# Patient Record
Sex: MALE | Race: WHITE | NOT HISPANIC OR LATINO | Employment: FULL TIME | URBAN - METROPOLITAN AREA
[De-identification: names, ages, dates, MRNs, and addresses within clinical notes are randomized per-mention and may not be internally consistent; named-entity substitution may affect disease eponyms.]

---

## 2018-01-29 ENCOUNTER — HOSPITAL ENCOUNTER (EMERGENCY)
Facility: HOSPITAL | Age: 43
Discharge: HOME | End: 2018-01-29
Admitting: EMERGENCY MEDICINE
Payer: COMMERCIAL

## 2018-01-29 ENCOUNTER — APPOINTMENT (EMERGENCY)
Dept: RADIOLOGY | Facility: HOSPITAL | Age: 43
End: 2018-01-29
Payer: COMMERCIAL

## 2018-01-29 VITALS
SYSTOLIC BLOOD PRESSURE: 141 MMHG | HEART RATE: 89 BPM | WEIGHT: 180 LBS | RESPIRATION RATE: 16 BRPM | TEMPERATURE: 98.1 F | DIASTOLIC BLOOD PRESSURE: 75 MMHG | OXYGEN SATURATION: 98 %

## 2018-01-29 DIAGNOSIS — J06.9 VIRAL URI WITH COUGH: ICD-10-CM

## 2018-01-29 DIAGNOSIS — R68.89 FLU-LIKE SYMPTOMS: Primary | ICD-10-CM

## 2018-01-29 LAB — S PYO AG THROAT QL: NEGATIVE

## 2018-01-29 PROCEDURE — 71046 X-RAY EXAM CHEST 2 VIEWS: CPT

## 2018-01-29 PROCEDURE — 87070 CULTURE OTHR SPECIMN AEROBIC: CPT | Performed by: PHYSICIAN ASSISTANT

## 2018-01-29 PROCEDURE — 87430 STREP A AG IA: CPT | Performed by: PHYSICIAN ASSISTANT

## 2018-01-29 PROCEDURE — 99283 EMERGENCY DEPT VISIT LOW MDM: CPT

## 2018-01-29 NOTE — DISCHARGE INSTRUCTIONS
Viral Syndrome   WHAT YOU NEED TO KNOW:   Viral syndrome is a term used for a viral infection that has no clear cause  Viruses are spread easily from person to person through the air and on shared items  DISCHARGE INSTRUCTIONS:   Call 911 for the following:   · You have a seizure  · You cannot be woken  · You have chest pain or trouble breathing  Return to the emergency department if:   · You have a stiff neck, a bad headache, and sensitivity to light  · You feel weak, dizzy, or confused  · You stop urinating or urinate a lot less than normal      · You cough up blood or thick, yellow or green, mucus  · You have severe abdominal pain or your abdomen is larger than usual   Contact your healthcare provider if:   · Your symptoms do not get better with treatment, or get worse, after 3 days  · You have a rash or ear pain  · You have burning when you urinate  · You have questions or concerns about your condition or care  Medicines: You may  need any of the following:  · Acetaminophen  decreases pain and fever  It is available without a doctor's order  Ask how much medicine to take and how often to take it  Follow directions  Acetaminophen can cause liver damage if not taken correctly  · NSAIDs , such as ibuprofen, help decrease swelling, pain, and fever  NSAIDs can cause stomach bleeding or kidney problems in certain people  If you take blood thinner medicine, always ask your healthcare provider if NSAIDs are safe for you  Always read the medicine label and follow directions  · Cold medicine  helps decrease swelling, control a cough, and relieve chest or nasal congestion  · Saline nasal spray  helps decrease nasal congestion  · Take your medicine as directed  Contact your healthcare provider if you think your medicine is not helping or if you have side effects  Tell him of her if you are allergic to any medicine   Keep a list of the medicines, vitamins, and herbs you take  Include the amounts, and when and why you take them  Bring the list or the pill bottles to follow-up visits  Carry your medicine list with you in case of an emergency  Manage your symptoms:   · Drink liquids as directed  to prevent dehydration  Ask how much liquid to drink each day and which liquids are best for you  Ask if you should drink an oral rehydration solution (ORS)  An ORS has the right amounts of water, salts, and sugar you need to replace body fluids  This may help prevent dehydration caused by vomiting or diarrhea  Do not drink liquids with caffeine  Drinks with caffeine can make dehydration worse  · Get plenty of rest  to help your body heal  Take naps throughout the day  Ask your healthcare provider when you can return to work and your normal activities  · Use a cool mist humidifier  to help you breathe easier if you have nasal or chest congestion  Ask your healthcare provider how to use a cool mist humidifier  · Eat honey or use cough drops  to help decrease throat discomfort  Ask your healthcare provider how much honey you should eat each day  Cough drops are available without a doctor's order  Follow directions for taking cough drops  · Do not smoke and stay away from others who smoke  Nicotine and other chemicals in cigarettes and cigars can cause lung damage  Smoking can also delay healing  Ask your healthcare provider for information if you currently smoke and need help to quit  E-cigarettes or smokeless tobacco still contain nicotine  Talk to your healthcare provider before you use these products  · Wash your hands frequently  to prevent the spread of germs to others  Use soap and water  Use gel hand  when soap and water are not available  Wash your hands after you use the bathroom, cough, or sneeze  Wash your hands before you prepare or eat food    Follow up with your healthcare provider as directed:  Write down your questions so you remember to ask them during your visits  © 2017 [employer] Information is for End User's use only and may not be sold, redistributed or otherwise used for commercial purposes  All illustrations and images included in CareNotes® are the copyrighted property of [employer]  or Jeremie Blev[employer]  The above information is an  only  It is not intended as medical advice for individual conditions or treatments  Talk to your doctor, nurse or pharmacist before following any medical regimen to see if it is safe and effective for you  Influenza   WHAT YOU NEED TO KNOW:   Influenza (the flu) is an infection caused by the influenza virus  The flu is easily spread when an infected person coughs, sneezes, or has close contact with others  You may be able to spread the flu to others for 1 week or longer after signs or symptoms appear  DISCHARGE INSTRUCTIONS:   Call 911 for any of the following:   · You have trouble breathing, and your lips look purple or blue  · You have a seizure  Return to the emergency department if:   · You are dizzy, or you are urinating less or not at all  · You have a headache with a stiff neck, and you feel tired or confused  · You have new pain or pressure in your chest     · Your symptoms, such as shortness of breath, vomiting, or diarrhea, get worse  · Your symptoms, such as fever and coughing, seem to get better, but then get worse  Contact your healthcare provider if:   · You have new muscle pain or weakness  · You have questions or concerns about your condition or care  Medicines: You may need any of the following:  · Acetaminophen  decreases pain and fever  It is available without a doctor's order  Ask how much to take and how often to take it  Follow directions  Acetaminophen can cause liver damage if not taken correctly  · NSAIDs , such as ibuprofen, help decrease swelling, pain, and fever   This medicine is available with or without a doctor's order  NSAIDs can cause stomach bleeding or kidney problems in certain people  If you take blood thinner medicine, always ask your healthcare provider if NSAIDs are safe for you  Always read the medicine label and follow directions  · Antivirals  help fight a viral infection  · Take your medicine as directed  Contact your healthcare provider if you think your medicine is not helping or if you have side effects  Tell him or her if you are allergic to any medicine  Keep a list of the medicines, vitamins, and herbs you take  Include the amounts, and when and why you take them  Bring the list or the pill bottles to follow-up visits  Carry your medicine list with you in case of an emergency  Rest  as much as you can to help you recover  Drink liquids as directed  to help prevent dehydration  Ask how much liquid to drink each day and which liquids are best for you  Prevent the spread of influenza:   · Wash your hands often  Use soap and water  Wash your hands after you use the bathroom, change a child's diapers, or sneeze  Wash your hands before you prepare or eat food  Use gel hand cleanser when soap and water are not available  Do not touch your eyes, nose, or mouth unless you have washed your hands first            · Cover your mouth when you sneeze or cough  Cough into a tissue or the bend of your arm  · Clean shared items with a germ-killing   Clean table surfaces, doorknobs, and light switches  Do not share towels, silverware, and dishes with people who are sick  Wash bed sheets, towels, silverware, and dishes with soap and water  · Wear a mask  over your mouth and nose if you are sick or are near anyone who is sick  · Stay away from others  if you are sick  · Influenza vaccine  helps prevent influenza (flu)  Everyone older than 6 months should get a yearly influenza vaccine  Get the vaccine as soon as it is available, usually in September or October each year    Follow up with your healthcare provider as directed:  Write down your questions so you remember to ask them during your visits  © 2017 [employer] Information is for End User's use only and may not be sold, redistributed or otherwise used for commercial purposes  All illustrations and images included in CareNotes® are the copyrighted property of [employer]  or Jeremie Blevins  The above information is an  only  It is not intended as medical advice for individual conditions or treatments  Talk to your doctor, nurse or pharmacist before following any medical regimen to see if it is safe and effective for you

## 2018-01-29 NOTE — ED PROVIDER NOTES
History  Chief Complaint   Patient presents with    Cough     Cough and bilateral ear pain for last 5 day days  Patient is a 37 y/o male with no significant PMH who presents for evaluation of URI symptoms that began 5 days ago  He complains of subjective fever, chills, bodyaches, sore throat, bilateral ear pain, nasal congesiton and cough  He states he has been taking tylenol cold and flu and theraflu without relief  He has a decreased appetite but is still drinking fluids  He did not get a flu shot this year  He denies sick contacts  He denies shortness of breath, wheezing, asthma hx, neck pain or stiffness, rash, vomiting or diarrhea  None       History reviewed  No pertinent past medical history  Past Surgical History:   Procedure Laterality Date    APPENDECTOMY         History reviewed  No pertinent family history  I have reviewed and agree with the history as documented  Social History   Substance Use Topics    Smoking status: Never Smoker    Smokeless tobacco: Never Used    Alcohol use Yes        Review of Systems   Constitutional: Positive for appetite change, chills and fever  HENT: Positive for congestion, ear pain, sinus pressure and sore throat  Negative for ear discharge and trouble swallowing  Respiratory: Positive for cough  Negative for shortness of breath and wheezing  Cardiovascular: Negative for chest pain  Gastrointestinal: Negative for abdominal pain, diarrhea, nausea and vomiting  Loose stools occasionally    Genitourinary: Negative for difficulty urinating, dysuria and hematuria  Musculoskeletal: Negative for neck pain and neck stiffness  Skin: Negative for rash  All other systems reviewed and are negative        Physical Exam  ED Triage Vitals [01/29/18 1402]   Temperature Pulse Respirations Blood Pressure SpO2   98 1 °F (36 7 °C) 89 16 141/75 98 %      Temp Source Heart Rate Source Patient Position - Orthostatic VS BP Location FiO2 (%) Temporal Monitor Sitting Right arm --      Pain Score       7           Orthostatic Vital Signs  Vitals:    01/29/18 1402   BP: 141/75   Pulse: 89   Patient Position - Orthostatic VS: Sitting       Physical Exam   Constitutional: He is oriented to person, place, and time  Vital signs are normal  He appears well-developed and well-nourished  He is active  Non-toxic appearance  No distress  HENT:   Head: Normocephalic and atraumatic  Right Ear: Hearing, external ear and ear canal normal  Tympanic membrane is erythematous  Left Ear: Hearing, external ear and ear canal normal  Tympanic membrane is erythematous  Nose: Right sinus exhibits no maxillary sinus tenderness and no frontal sinus tenderness  Left sinus exhibits no maxillary sinus tenderness and no frontal sinus tenderness  Mouth/Throat: Uvula is midline and mucous membranes are normal  No oral lesions  No trismus in the jaw  No uvula swelling  Posterior oropharyngeal erythema present  No oropharyngeal exudate, posterior oropharyngeal edema or tonsillar abscesses  Tonsils are 2+ on the right  Tonsils are 2+ on the left  No tonsillar exudate  Mild nasal congestion    Eyes: Conjunctivae and EOM are normal    Neck: Normal range of motion  Neck supple  Cardiovascular: Normal rate, regular rhythm and normal heart sounds  Pulmonary/Chest: Effort normal and breath sounds normal  No respiratory distress  He has no wheezes  He has no rales  Abdominal: Soft  Bowel sounds are normal  He exhibits no distension and no mass  There is no tenderness  There is no guarding  Musculoskeletal: Normal range of motion  Lymphadenopathy:     He has no cervical adenopathy  Neurological: He is alert and oriented to person, place, and time  Skin: Skin is warm and dry  No rash noted  He is not diaphoretic  Psychiatric: He has a normal mood and affect  His behavior is normal  Judgment and thought content normal    Nursing note and vitals reviewed        ED Medications  Medications - No data to display    Diagnostic Studies  Results Reviewed     Procedure Component Value Units Date/Time    Rapid Beta strep screen [[id number]]  (Normal) Collected:  01/29/18 1620    Lab Status:  Final result Specimen:  Throat from Throat Updated:  01/29/18 1633     Rapid Strep A Screen Negative    Throat culture [[id number]] Collected:  01/29/18 1620    Lab Status: In process Specimen:  Throat from Throat Updated:  01/29/18 1633                 XR chest 2 views   Final Result by Tremayne Junior DO (01/29 1634)      No active pulmonary disease  Workstation performed: [id number]                    Procedures  Procedures       Phone Contacts  ED Phone Contact    ED Course  ED Course                                MDM  CritCare Time    Disposition  Final diagnoses:   Flu-like symptoms   Viral URI with cough     Time reflects when diagnosis was documented in both MDM as applicable and the Disposition within this note     Time User Action Codes Description Comment    1/29/2018  5:01 PM Dirk Spring Add [R68 89] Flu-like symptoms     1/29/2018  5:01 PM Dirk Spring Add [J06 9,  B97 89] Viral URI with cough       ED Disposition     ED Disposition Condition Comment    Discharge  [facility] [address] discharge to home/self care  Condition at discharge: Good        Follow-up Information     Follow up With Specialties Details Why Contact Info Additional Samantha [place] [facility] Schedule an appointment as soon as possible for a visit in 1 day  [address] [place] [address] [address] [id number] [facility] Department Emergency Medicine  If symptoms worsen or new symptoms arise as discussed  [facility]  [phone] [place] ED, [address] [facility]  , [place], [place], [address]        There are no discharge medications for this patient  No discharge procedures on file      ED Provider  Electronically Signed by           Charmayne Forte, PA-C  01/30/18 7861

## 2018-01-31 LAB — BACTERIA THROAT CULT: NORMAL

## 2020-10-28 ENCOUNTER — TELEMEDICINE (OUTPATIENT)
Dept: INTERNAL MEDICINE CLINIC | Facility: CLINIC | Age: 45
End: 2020-10-28

## 2020-10-28 DIAGNOSIS — J31.0 RHINITIS, UNSPECIFIED TYPE: Primary | ICD-10-CM

## 2020-10-28 PROCEDURE — 99201 PR OFFICE OUTPATIENT NEW 10 MINUTES: CPT | Performed by: PHYSICIAN ASSISTANT

## 2020-10-28 RX ORDER — LORATADINE 10 MG/1
10 TABLET ORAL DAILY
Qty: 14 TABLET | Refills: 0 | Status: SHIPPED | OUTPATIENT
Start: 2020-10-28 | End: 2021-03-11

## 2020-10-28 RX ORDER — FLUTICASONE PROPIONATE 50 MCG
2 SPRAY, SUSPENSION (ML) NASAL DAILY
Qty: 1 BOTTLE | Refills: 0 | Status: SHIPPED | OUTPATIENT
Start: 2020-10-28 | End: 2021-03-11

## 2020-12-21 RX ORDER — AMOXICILLIN 250 MG
1 CAPSULE ORAL 2 TIMES DAILY
COMMUNITY
Start: 2020-12-10 | End: 2021-03-11

## 2020-12-21 RX ORDER — IBUPROFEN 600 MG/1
600 TABLET ORAL EVERY 6 HOURS PRN
COMMUNITY
Start: 2020-12-10 | End: 2021-03-11

## 2020-12-22 ENCOUNTER — TELEPHONE (OUTPATIENT)
Dept: INTERNAL MEDICINE CLINIC | Facility: CLINIC | Age: 45
End: 2020-12-22

## 2020-12-30 ENCOUNTER — TELEMEDICINE (OUTPATIENT)
Dept: INTERNAL MEDICINE CLINIC | Facility: CLINIC | Age: 45
End: 2020-12-30

## 2020-12-30 DIAGNOSIS — S28.0XXD CRUSHED CHEST, SUBSEQUENT ENCOUNTER: ICD-10-CM

## 2020-12-30 DIAGNOSIS — R29.898 WEAKNESS OF SHOULDER: ICD-10-CM

## 2020-12-30 DIAGNOSIS — R20.0 NUMBNESS OF FINGERS: ICD-10-CM

## 2020-12-30 DIAGNOSIS — G89.29 CHRONIC LEFT SHOULDER PAIN: Primary | ICD-10-CM

## 2020-12-30 DIAGNOSIS — M25.512 CHRONIC LEFT SHOULDER PAIN: Primary | ICD-10-CM

## 2020-12-30 PROBLEM — M62.82 DISEASE CHARACTERIZED BY DESTRUCTION OF SKELETAL MUSCLE: Status: ACTIVE | Noted: 2020-12-09

## 2020-12-30 PROBLEM — S28.0XXA: Status: ACTIVE | Noted: 2020-12-09

## 2020-12-30 PROCEDURE — 99214 OFFICE O/P EST MOD 30 MIN: CPT | Performed by: PHYSICIAN ASSISTANT

## 2020-12-30 RX ORDER — TRAMADOL HYDROCHLORIDE 50 MG/1
50 TABLET ORAL EVERY 6 HOURS PRN
COMMUNITY
End: 2020-12-30 | Stop reason: SDUPTHER

## 2020-12-30 RX ORDER — TRAMADOL HYDROCHLORIDE 50 MG/1
50 TABLET ORAL EVERY 8 HOURS PRN
Qty: 20 TABLET | Refills: 0 | Status: SHIPPED | OUTPATIENT
Start: 2020-12-30 | End: 2021-02-04 | Stop reason: SDUPTHER

## 2020-12-30 RX ORDER — OXYCODONE HYDROCHLORIDE 5 MG/1
TABLET ORAL
COMMUNITY
Start: 2020-12-10 | End: 2020-12-30

## 2020-12-30 RX ORDER — METHOCARBAMOL 500 MG/1
500 TABLET, FILM COATED ORAL 3 TIMES DAILY PRN
Qty: 60 TABLET | Refills: 1 | Status: SHIPPED | OUTPATIENT
Start: 2020-12-30 | End: 2021-02-04 | Stop reason: SDUPTHER

## 2020-12-30 RX ORDER — METHOCARBAMOL 500 MG/1
500 TABLET, FILM COATED ORAL 3 TIMES DAILY PRN
COMMUNITY
Start: 2020-12-10 | End: 2020-12-30 | Stop reason: SDUPTHER

## 2021-01-08 ENCOUNTER — TELEPHONE (OUTPATIENT)
Dept: INTERNAL MEDICINE CLINIC | Facility: CLINIC | Age: 46
End: 2021-01-08

## 2021-01-08 NOTE — TELEPHONE ENCOUNTER
Jerel Zhang - I cannot do peer to peer because I did not see patient in office to examine and I dont have any more info that what patient told me - it was virtual and he did not have access to video either  This was the first time I saw him for this issue

## 2021-01-08 NOTE — TELEPHONE ENCOUNTER
This patient was scheduled for MRI LEFT SHOULDER  The information I submitted was not enough to get it approved, so [facility] is requesting a [id number]  If you are able to do so, the number is below  If you wish to withdraw this request let us know so we can call central scheduling to cancel their upcoming appointment  HAYDEN:  [phone] Option #3  [id number]  Insurance:[employer] HMO   ID#: [id number]      Attending ANTWANI [phone] Alexi Salcido please respond within 24 hrs case is time sensitive  Thank you

## 2021-02-04 ENCOUNTER — OFFICE VISIT (OUTPATIENT)
Dept: INTERNAL MEDICINE CLINIC | Facility: CLINIC | Age: 46
End: 2021-02-04

## 2021-02-04 ENCOUNTER — TELEPHONE (OUTPATIENT)
Dept: INTERNAL MEDICINE CLINIC | Facility: CLINIC | Age: 46
End: 2021-02-04

## 2021-02-04 VITALS
TEMPERATURE: 98.2 F | WEIGHT: 193.4 LBS | SYSTOLIC BLOOD PRESSURE: 127 MMHG | HEART RATE: 85 BPM | BODY MASS INDEX: 31.08 KG/M2 | DIASTOLIC BLOOD PRESSURE: 84 MMHG | HEIGHT: 66 IN

## 2021-02-04 DIAGNOSIS — R29.898 WEAKNESS OF SHOULDER: ICD-10-CM

## 2021-02-04 DIAGNOSIS — M25.512 CHRONIC LEFT SHOULDER PAIN: ICD-10-CM

## 2021-02-04 DIAGNOSIS — G89.29 CHRONIC LEFT SHOULDER PAIN: ICD-10-CM

## 2021-02-04 DIAGNOSIS — R20.0 NUMBNESS OF FINGERS: ICD-10-CM

## 2021-02-04 DIAGNOSIS — S28.0XXD CRUSHED CHEST, SUBSEQUENT ENCOUNTER: ICD-10-CM

## 2021-02-04 PROCEDURE — 3725F SCREEN DEPRESSION PERFORMED: CPT | Performed by: INTERNAL MEDICINE

## 2021-02-04 PROCEDURE — 99214 OFFICE O/P EST MOD 30 MIN: CPT | Performed by: INTERNAL MEDICINE

## 2021-02-04 RX ORDER — TRAMADOL HYDROCHLORIDE 50 MG/1
50 TABLET ORAL EVERY 8 HOURS PRN
Qty: 21 TABLET | Refills: 0 | Status: SHIPPED | OUTPATIENT
Start: 2021-02-04 | End: 2021-02-11

## 2021-02-04 RX ORDER — LIDOCAINE HYDROCHLORIDE 20 MG/ML
JELLY TOPICAL AS NEEDED
Qty: 30 ML | Refills: 1 | Status: SHIPPED | OUTPATIENT
Start: 2021-02-04 | End: 2021-03-11

## 2021-02-04 RX ORDER — METHOCARBAMOL 500 MG/1
500 TABLET, FILM COATED ORAL 3 TIMES DAILY PRN
Qty: 60 TABLET | Refills: 1 | Status: SHIPPED | OUTPATIENT
Start: 2021-02-04 | End: 2021-04-29

## 2021-02-04 NOTE — PATIENT INSTRUCTIONS
Please obtain shoulder MRI  Please continue to take Robaxin and Tramadol for shoulder pain  Please begin physical therapy  Please return after MRI is obtained

## 2021-02-04 NOTE — TELEPHONE ENCOUNTER
Receive call form patient pharmacy stating Tramadol 50 mg  required prior authorization because insurance would cover one time only of this medication  [facility]  [facility] and [facility] today 2/4/2021 with today's appt notes and last labs  Confirmation received, placed in prior authorization folder at the nurse station

## 2021-02-04 NOTE — PROGRESS NOTES
[address] [facility]  INTERNAL MEDICINE OFFICE VISIT     PATIENT INFORMATION     Girma Jewell   50 y o  male   MRN: [id number]    ASSESSMENT/PLAN     Diagnoses and all orders for this visit:    Chronic left shoulder pain  -     Ambulatory referral to Physical Therapy; Future  -     methocarbamol (ROBAXIN) 500 mg tablet; Take 1 tablet (500 mg total) by mouth 3 (three) times a day as needed (back/neck muscle spasms)  -     MRI shoulder left wo contrast; Future  -     traMADol (ULTRAM) 50 mg tablet; Take 1 tablet (50 mg total) by mouth every 8 (eight) hours as needed for severe pain for up to 7 days  -     lidocaine (XYLOCAINE) 2 % topical gel; Apply topically as needed for mild pain Left Shoulder  Numbness of fingers  -     methocarbamol (ROBAXIN) 500 mg tablet; Take 1 tablet (500 mg total) by mouth 3 (three) times a day as needed (back/neck muscle spasms)  -     traMADol (ULTRAM) 50 mg tablet; Take 1 tablet (50 mg total) by mouth every 8 (eight) hours as needed for severe pain for up to 7 days    Weakness of shoulder  -     methocarbamol (ROBAXIN) 500 mg tablet; Take 1 tablet (500 mg total) by mouth 3 (three) times a day as needed (back/neck muscle spasms)  -     traMADol (ULTRAM) 50 mg tablet; Take 1 tablet (50 mg total) by mouth every 8 (eight) hours as needed for severe pain for up to 7 days    Crushed chest, subsequent encounter  -     methocarbamol (ROBAXIN) 500 mg tablet; Take 1 tablet (500 mg total) by mouth 3 (three) times a day as needed (back/neck muscle spasms)  -     traMADol (ULTRAM) 50 mg tablet; Take 1 tablet (50 mg total) by mouth every 8 (eight) hours as needed for severe pain for up to 7 days    Schedule a follow-up appointment in 5 weeks  HEALTH MAINTENANCE     There is no immunization history on file for this patient    CHIEF COMPLAINT     Chief Complaint   Patient presents with    Shoulder Pain     left shoulder pain- having this pain for years      HISTORY OF PRESENT ILLNESS     The patient is a 50year old male with a past medical history of left shoulder pain and weakness who presented to the clinic today complaining of left shoulder pain and numbness  He is a  and states that he has been experiencing shoulder pain for many years  Recently in December the patient was bench pressing and felt his left shoulder "give out"  He went to the emergency department at that time and a CT shoulder was negative for any fracture or noticeable injury  Since that time the patient has continued to have severe shoulder pain that radiates down his arm to his fingers  He also experiences intermittent numbness of his arm, mostly at night  He denies any back or neck pain  He has no complaints with his right shoulder  He was seen via a telemedicine visit in late December and a shoulder MRI was ordered, however the study was cancelled due to the patient not being physically examined prior  He is currently taking Robaxin with mild relief  He was given tramadol in the past which helped  He has not seen physical therapy  He is continuing to power lift and uses a bilateral arm wrap called a "bench daddy" which he wears during bench press which completely alleviates the pain  Aside from his shoulder he had no other acute complaints  REVIEW OF SYSTEMS     Review of Systems   Constitutional: Negative for activity change, appetite change, chills, fatigue and fever  HENT: Negative for congestion, ear discharge, ear pain, hearing loss, sinus pain, sore throat, tinnitus and trouble swallowing  Eyes: Negative for pain and visual disturbance  Respiratory: Negative for cough, chest tightness, shortness of breath and wheezing  Cardiovascular: Negative for chest pain and leg swelling  Gastrointestinal: Negative for abdominal distention, abdominal pain, anal bleeding, blood in stool, constipation, diarrhea and nausea  Endocrine: Negative for cold intolerance and heat intolerance  Genitourinary: Negative for difficulty urinating, dysuria and frequency  Musculoskeletal: Negative for arthralgias, back pain, myalgias and neck pain  Left shoulder pain/numbness   Skin: Negative for rash  Allergic/Immunologic: Negative for environmental allergies and food allergies  Neurological: Negative for dizziness, light-headedness and headaches  Hematological: Negative for adenopathy  Psychiatric/Behavioral: Negative for agitation, behavioral problems and confusion  OBJECTIVE     Vitals:    02/04/21 1300   BP: 127/84   BP Location: Left arm   Patient Position: Sitting   Cuff Size: Standard   Pulse: 85   Temp: 98 2 °F (36 8 °C)   TempSrc: Temporal   Weight: 87 7 kg (193 lb 6 4 oz)   Height: 5' 6" (1 676 m)     Physical Exam  Constitutional:       Appearance: He is well-developed  HENT:      Head: Normocephalic and atraumatic  Nose: Nose normal    Eyes:      General:         Right eye: No discharge  Left eye: No discharge  Conjunctiva/sclera: Conjunctivae normal       Pupils: Pupils are equal, round, and reactive to light  Neck:      Thyroid: No thyromegaly  Cardiovascular:      Rate and Rhythm: Normal rate and regular rhythm  Heart sounds: Normal heart sounds  No murmur  No friction rub  No gallop  Pulmonary:      Effort: Pulmonary effort is normal  No respiratory distress  Breath sounds: Normal breath sounds  No stridor  No wheezing or rales  Chest:      Chest wall: No tenderness  Abdominal:      General: Bowel sounds are normal  There is no distension  Palpations: Abdomen is soft  There is no mass  Tenderness: There is no abdominal tenderness  There is no guarding or rebound  Musculoskeletal:         General: Tenderness (Lateral aspect of left shoulder) present  No deformity  Right lower leg: No edema  Left lower leg: No edema        Comments: Patient was unable to extend Left shoulder beyond 45 degrees on flexion, extension, and abduction due to severe pain  Adduction, internal and external rotation were normal  Sensation was intact throughout upper extremities bilaterally  Right shoulder was normal     Lymphadenopathy:      Cervical: No cervical adenopathy  Skin:     General: Skin is warm and dry  Neurological:      Mental Status: He is alert and oriented to person, place, and time  Sensory: No sensory deficit  Psychiatric:         Behavior: Behavior normal          Thought Content: Thought content normal          Judgment: Judgment normal        CURRENT MEDICATIONS     Current Outpatient Medications:     methocarbamol (ROBAXIN) 500 mg tablet, Take 1 tablet (500 mg total) by mouth 3 (three) times a day as needed (back/neck muscle spasms), Disp: 60 tablet, Rfl: 1    traMADol (ULTRAM) 50 mg tablet, Take 1 tablet (50 mg total) by mouth every 8 (eight) hours as needed for severe pain for up to 7 days, Disp: 21 tablet, Rfl: 0    fluticasone (FLONASE) 50 mcg/act nasal spray, 2 sprays into each nostril daily (Patient not taking: Reported on 12/30/2020), Disp: 1 Bottle, Rfl: 0    ibuprofen (MOTRIN) 600 mg tablet, Take 600 mg by mouth every 6 (six) hours as needed, Disp: , Rfl:     lidocaine (XYLOCAINE) 2 % topical gel, Apply topically as needed for mild pain Left Shoulder , Disp: 30 mL, Rfl: 1    loratadine (CLARITIN) 10 mg tablet, Take 1 tablet (10 mg total) by mouth daily (Patient not taking: Reported on 2/4/2021), Disp: 14 tablet, Rfl: 0    senna-docusate sodium (Senokot S) 8 6-50 mg per tablet, Take 1 tablet by mouth 2 (two) times a day, Disp: , Rfl:     PAST MEDICAL & SURGICAL HISTORY   History reviewed  No pertinent past medical history    Past Surgical History:   Procedure Laterality Date    APPENDECTOMY      MOUTH SURGERY       SOCIAL & FAMILY HISTORY     Social History     Socioeconomic History    Marital status: Single     Spouse name: Not on file    Number of children: Not on file    Years of education: Not on file    Highest education level: Not on file   Occupational History    Not on file   Social Needs    Financial resource strain: Not on file    Food insecurity     Worry: Not on file     Inability: Not on file    Transportation needs     Medical: Not on file     Non-medical: Not on file   Tobacco Use    Smoking status: Never Smoker    Smokeless tobacco: Never Used   Substance and Sexual Activity    Alcohol use: Yes     Frequency: Monthly or less     Drinks per session: 3 or 4    Drug use: No    Sexual activity: Not on file   Lifestyle    Physical activity     Days per week: Not on file     Minutes per session: Not on file    Stress: Not on file   Relationships    Social connections     Talks on phone: Not on file     Gets together: Not on file     Attends Scientologist service: Not on file     Active member of club or organization: Not on file     Attends meetings of clubs or organizations: Not on file     Relationship status: Not on file    Intimate partner violence     Fear of current or ex partner: Not on file     Emotionally abused: Not on file     Physically abused: Not on file     Forced sexual activity: Not on file   Other Topics Concern    Not on file   Social History Narrative    Not on file     Social History     Substance and Sexual Activity   Alcohol Use Yes    Frequency: Monthly or less    Drinks per session: 3 or 4     Substance and Sexual Activity   Alcohol Use Yes    Frequency: Monthly or less    Drinks per session: 3 or 4        Substance and Sexual Activity   Drug Use No     Social History     Tobacco Use   Smoking Status Never Smoker   Smokeless Tobacco Never Used     Family History   Problem Relation Age of Onset    Diabetes Mother     Hypertension Father     No Known Problems Brother      ==  Hannah Adan MD  IM Resident, PGY-1  [facility] [address]  [address]   [facility] - [address] [address]  Office: [phone]  Fax: [phone]

## 2021-02-05 ENCOUNTER — TELEPHONE (OUTPATIENT)
Dept: INTERNAL MEDICINE CLINIC | Facility: CLINIC | Age: 46
End: 2021-02-05

## 2021-02-08 NOTE — TELEPHONE ENCOUNTER
Prior-Authorization for Tramadol has been approved until 2/12/2021  Attempted made to contact patient and didn't answer  Message left on voice mail with the information and to call us back if he have any question or concern

## 2021-02-10 ENCOUNTER — TELEPHONE (OUTPATIENT)
Dept: INTERNAL MEDICINE CLINIC | Facility: CLINIC | Age: 46
End: 2021-02-10

## 2021-02-10 NOTE — TELEPHONE ENCOUNTER
Patient called to state he just received from MRI Department and was denied     Patient wants to know why MRI was denied - MRI scheduled for 2/11/2021 -    If they are indicating that he needs to do physical therapy, then patient needs to know what is wrong  If it's a tear than rehab will make it worse      Please check into this and call the patient

## 2021-02-16 ENCOUNTER — TELEPHONE (OUTPATIENT)
Dept: INTERNAL MEDICINE CLINIC | Facility: CLINIC | Age: 46
End: 2021-02-16

## 2021-02-16 NOTE — TELEPHONE ENCOUNTER
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

## 2021-02-16 NOTE — TELEPHONE ENCOUNTER
Please see 1/8/2021 telephone note then 2/10/2021 telephone note  Now patient calling asking what to do at this time   Can you please review and advise

## 2021-02-19 NOTE — TELEPHONE ENCOUNTER
Please call and let the patient know that his insurance will not cover the MRI until he has tried physical therapy  Thanks!

## 2021-02-22 NOTE — TELEPHONE ENCOUNTER
Patient made aware he needs to do the physical therapy before insurance will cover the MRI  He is not happy about it but stated he will have to do it

## 2021-03-02 ENCOUNTER — EVALUATION (OUTPATIENT)
Dept: PHYSICAL THERAPY | Facility: OTHER | Age: 46
End: 2021-03-02
Payer: COMMERCIAL

## 2021-03-02 DIAGNOSIS — M25.512 ACUTE PAIN OF LEFT SHOULDER: Primary | ICD-10-CM

## 2021-03-02 PROCEDURE — 97161 PT EVAL LOW COMPLEX 20 MIN: CPT | Performed by: PHYSICAL THERAPIST

## 2021-03-02 NOTE — PROGRESS NOTES
PT Evaluation     Today's date: 3/2/2021  Patient name: Angela Del Toro  : 1975  MRN: [id number]  Referring provider: Jihan Fang MD  Dx: No diagnosis found  Assessment  Assessment details: Angela Del Toro is a pleasant 50 y o  male who presents with Shoulder pain after performing a heavy lift on   Patient reported that he he has N/T some Neck pain  Patient reported that he is still able to still lift heavy at the gym  Unable to performed most IADL's uses UE to compensate  Patient reported unable to lift arm to to perform self care  patient has severe ER weakness and FE weakness likely limited by pain  Patient does not seem to be a good Canidate for PT at this time  He was give pain free AAROM exercises as his shoulder is getting very stiff and he will be at risk of getting adhesive capsulitis if he does not start to regain motion  Some pain is reproduced by the C-S movements and reduced  HEP issued and POC reviewed  Impairments: abnormal coordination, abnormal muscle firing, abnormal or restricted ROM, activity intolerance, impaired physical strength, lacks appropriate home exercise program, pain with function and poor body mechanics    Symptom irritability: moderateUnderstanding of Dx/Px/POC: good   Prognosis: good    Goals  Short Term:  Pt will report decreased levels of pain by at least 2 subjective ratings in 4 weeks  Pt will demonstrate improved ROM by at least 10 degrees in 4 weeks  Pt will demonstrate improved strength by 1/2 grade  Long Term:   Pt will be independent in their HEP in 8 weeks  Pt will be be pain free with IADL's  Pt will demonstrate improved FOTO, > 80         Plan  Plan details: Prognosis above is given PT services 2x/week tapering to 1x/week over the next 3 months and home program adherence    Patient would benefit from: skilled physical therapy  Planned modality interventions: thermotherapy: hydrocollator packs  Planned therapy interventions: activity modification, joint mobilization, manual therapy, motor coordination training, neuromuscular re-education, patient education, self care, therapeutic activities, therapeutic exercise, graded activity and home exercise program  Frequency: 2x week  Treatment plan discussed with: patient        Subjective Evaluation    Pain  At best pain ratin  At worst pain ratin    Patient Goals  Patient goals for therapy: decreased pain, independence with ADLs/IADLs, return to sport/leisure activities, increased motion and increased strength          Objective     General Comments:      Shoulder Comments   Special test  Unable to perform all testing secondary to pain and guarding  We discussed his ability to bench greater than 400lbs eliminates several likley pathologies in the shoulder and ultimatly PT will likley be what give him the most relief  Shoulder = joint mob = unable to assess    apprehension test=  uanble   Anterior drawer test = unable    [place] test= unable   biceps load=   NEER's=  Fong/Ángel test =     Speed's test=  unable  becca impingement test = unable  passive compression test=  lift off =unable   belly press= -  ER lag= + pain   Scapular exam: unable     PROM and AROM difficult to asses due to guarding  While performing AAROM he was able to achive about 100 degrees of FE        C-S negtive sharp pusher, VBI,  He does have limited ROM and pain secondary to lifting last night   spulings + increase L saided UT pain       Flowsheet Rows      Most Recent Value   PT/OT G-Codes   Current Score  34   Projected Score  67             Precautions: high fear avoidance       Manuals             PROM              LASER                                        Neuro Re-Ed                                                                                                        Ther Ex Ther Activity                                       Gait Training                                       Modalities

## 2021-03-09 ENCOUNTER — OFFICE VISIT (OUTPATIENT)
Dept: PHYSICAL THERAPY | Facility: OTHER | Age: 46
End: 2021-03-09
Payer: COMMERCIAL

## 2021-03-09 DIAGNOSIS — M25.512 ACUTE PAIN OF LEFT SHOULDER: Primary | ICD-10-CM

## 2021-03-09 PROCEDURE — 97140 MANUAL THERAPY 1/> REGIONS: CPT | Performed by: PHYSICAL THERAPIST

## 2021-03-09 PROCEDURE — 97110 THERAPEUTIC EXERCISES: CPT | Performed by: PHYSICAL THERAPIST

## 2021-03-09 NOTE — PROGRESS NOTES
Daily Note     Today's date: 3/9/2021  Patient name: Lisa Villarreal  : 1975  MRN: [id number]  Referring provider: Sd Varela MD  Dx:   Encounter Diagnosis     ICD-10-CM    1  Acute pain of left shoulder  M25 [id number]                   Subjective:patient still has very poor tolerance to all exercises  Unable to tolerate much in terms of exercise in PT today we will defer PT at this time until he follows up with his PCP  he does not seem to be a good candidate for PT at this time  Objective: See treatment diary below      Assessment: Tolerated treatment well  Patient demonstrated fatigue post treatment      Plan: Continue per plan of care        Precautions: high fear avoidance       Manuals 3 9 21            PROM  MJ             LASER                                        Neuro Re-Ed                                                                                                        Ther Ex             pullies  3min             S/L ER  10x2             Cane flex stretch  10''x10             Pendulums  20ea             Post capsule stretch 10''x10                                                    Ther Activity                                       Gait Training                                       Modalities

## 2021-03-10 NOTE — TELEPHONE ENCOUNTER
Patient has appointment with Jenn Robles 3/11/21 at 1:20pm  -- made notation on the screen to discuss this concern

## 2021-03-11 ENCOUNTER — OFFICE VISIT (OUTPATIENT)
Dept: INTERNAL MEDICINE CLINIC | Facility: CLINIC | Age: 46
End: 2021-03-11

## 2021-03-11 VITALS
HEART RATE: 88 BPM | HEIGHT: 66 IN | SYSTOLIC BLOOD PRESSURE: 113 MMHG | BODY MASS INDEX: 30.82 KG/M2 | WEIGHT: 191.8 LBS | TEMPERATURE: 98.1 F | OXYGEN SATURATION: 99 % | DIASTOLIC BLOOD PRESSURE: 77 MMHG

## 2021-03-11 DIAGNOSIS — M25.512 CHRONIC LEFT SHOULDER PAIN: ICD-10-CM

## 2021-03-11 DIAGNOSIS — Z01.00 ROUTINE EYE EXAM: ICD-10-CM

## 2021-03-11 DIAGNOSIS — Z00.00 ROUTINE ADULT HEALTH MAINTENANCE: Primary | ICD-10-CM

## 2021-03-11 DIAGNOSIS — Z12.5 SCREENING PSA (PROSTATE SPECIFIC ANTIGEN): ICD-10-CM

## 2021-03-11 DIAGNOSIS — Z13.220 SCREENING, LIPID: ICD-10-CM

## 2021-03-11 DIAGNOSIS — G89.29 CHRONIC LEFT SHOULDER PAIN: ICD-10-CM

## 2021-03-11 DIAGNOSIS — Z83.3 FAMILY HISTORY OF DIABETES MELLITUS: ICD-10-CM

## 2021-03-11 DIAGNOSIS — Z11.4 SCREENING FOR HIV (HUMAN IMMUNODEFICIENCY VIRUS): ICD-10-CM

## 2021-03-11 DIAGNOSIS — E66.9 CLASS 1 OBESITY WITHOUT SERIOUS COMORBIDITY WITH BODY MASS INDEX (BMI) OF 30.0 TO 30.9 IN ADULT, UNSPECIFIED OBESITY TYPE: ICD-10-CM

## 2021-03-11 DIAGNOSIS — Z13.1 SCREENING FOR DIABETES MELLITUS: ICD-10-CM

## 2021-03-11 DIAGNOSIS — R32 URINARY INCONTINENCE, UNSPECIFIED TYPE: ICD-10-CM

## 2021-03-11 PROCEDURE — 3008F BODY MASS INDEX DOCD: CPT | Performed by: PHYSICIAN ASSISTANT

## 2021-03-11 PROCEDURE — 1036F TOBACCO NON-USER: CPT | Performed by: PHYSICIAN ASSISTANT

## 2021-03-11 PROCEDURE — 99396 PREV VISIT EST AGE 40-64: CPT | Performed by: PHYSICIAN ASSISTANT

## 2021-03-11 PROCEDURE — 3008F BODY MASS INDEX DOCD: CPT | Performed by: ORTHOPAEDIC SURGERY

## 2021-03-11 RX ORDER — TRAMADOL HYDROCHLORIDE 50 MG/1
50 TABLET ORAL EVERY 6 HOURS PRN
COMMUNITY
End: 2021-04-29

## 2021-03-11 NOTE — PROGRESS NOTES
Assessment/Plan:      Diagnoses and all orders for this visit:    Routine adult health maintenance    Chronic left shoulder pain  -     Ambulatory referral to Orthopedic Surgery; Future    Urinary incontinence, unspecified type  -     PSA, total and free; Future    Routine eye exam  -     Ambulatory referral to Ophthalmology; Future    Screening, lipid  -     Lipid panel; Future    Screening for diabetes mellitus  -     Hemoglobin A1C; Future    Family history of diabetes mellitus  -     Hemoglobin A1C; Future    Screening for HIV (human immunodeficiency virus)  -     HIV 1/2 Antigen/Antibody (4th Generation) w Reflex [facility]; Future    Screening PSA (prostate specific antigen)  -     PSA, total and free; Future    Class 1 obesity without serious comorbidity with body mass index (BMI) of 30 0 to 30 9 in adult, unspecified obesity type    Other orders  -     traMADol (ULTRAM) 50 mg tablet; Take 50 mg by mouth every 6 (six) hours as needed for moderate pain      patient is a 61-year-old male presenting today for annual physical     We did briefly address his chronic left shoulder pain  He saw me a few months ago through telephone encounter to discuss getting an MRI for his left shoulder  Unfortunately my exam was very limited as patient did not want to come into the office and video was not working  He did have CT scan in December 2020 showing no acute fracture, soft tissues were normal with no fluid collection to suggest a hematoma  Unfortunately I did attempt to order an MRI which was denied by insurance and I could not do a peer to peer as I had no information further regarding positive examination findings aside from what patient told me  I have advise since his pain is continuing to have an evaluation with Orthopedics and they can order an MRI if seen necessary  He did have physical therapy evaluation and was told that he is not an appropriate candidate for PT at this time    Referral to [facility] provided  Age-appropriate education regarding screenings and prevention was addressed with patient today  Briefly tried to address BMI currently 30 96  He states he does eat whatever he wants so I would recommend more fruits and vegetables, high-protein diet and limiting fast food, junk food and excessive carbohydrates  However I do believe a lot of his BMI is elevated secondary to his muscularity  I do recommend some cardiovascular activity as well however he needs to maintain this type of physique secondary to his weight class and his training and competitions  Patient did express to me that he has had some urinary incontinence, primarily with some dripping after voiding as he is about to pull up his pants  He denies any general urinary incontinence  He does note at times waking up 1-2 times a night to urinate as well  He is interested in getting a PSA which typically younger than 48 I would not check however with symptoms I will check PSA free and total and can consider referring to Urology for further evaluation  Referral to an ophthalmologist was provided with information for [facility] as well as [facility] for sight as he would like to consider LASIK surgery  Routine labs ordered including lipid panel, PSA, HIV  Will also include hemoglobin A1c as patient reports his mom has severe diabetes on insulin  He did have CBC and CMP checked when he was in the ED in December which were fairly unremarkable  He will get these done fasting and we will call him with results    Otherwise if no further issues we will plan to see him back on an as-needed basis, 1 year for annual physical     Chief Complaint   Patient presents with    Follow-up     discuss MRI per physical therapy patient nees to f u with PCP       Subjective:     Patient ID: Jeanine Poole is a 50 y o  male     50y/o male here today for annual physical  He is feeling well aside from his ongoing shoulder issues  states his shoulder issues are 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  for lifting  takign robaxin and tramadol and not working  He has trouble pushing or lifting to the side  States was evaluated by PT after MRI was denied, and was told he is not a candidate for PT at this time  Denies changes to his health otherwise  Last dental cleaning - 5-6 years ago, brushes teeth 1 x a daily  He wears glasses and contact - about 1 year ago  states vision constantly getting works - would like to consider lasik  Currently takes [facility] [employer] vitamins  He also uses amino acid, pre-workout and creatine  Protein supplement after workout  He exercises 3 x a week  He mainly does weight training  He eats whatever he wants, states he has a fast metabolism  States he hasnt switched any weight class  He drinks about 3bottles water/day  Juice and iced tea daily  ETOH - socially  Tobacco - denies  Drugs - denies    He currently has 1 female partner  He denies any issues with ejaculation or erection  He states he does note some urinary incontinence after voiding  He cannot tell if he feels he has completely emptied his bladder  He wakes about 1-2 x a night  Lives in a house, smoke alarms working  He feels safe in the home, feels safe in relatinship  Mother - diabetes, insulin dependant  Father - HTN, hyperlipidemia                  Review of Systems   Constitutional: Negative  HENT: Negative  Eyes:        As In HPI   Respiratory: Negative  Cardiovascular: Negative  Gastrointestinal: Negative  Genitourinary: Negative  Musculoskeletal:        As in HPI   Skin: Negative  Neurological: Negative  Psychiatric/Behavioral: Negative            The following portions of the patient's history were reviewed and updated as appropriate: allergies, current medications, past family history, past medical history, past social history, past surgical history and problem list       Objective:     Physical Exam  Vitals signs reviewed  Constitutional:       General: He is not in acute distress  Appearance: Normal appearance  He is not ill-appearing or toxic-appearing  Comments: Pt with BMI elevated of 30 96, but with very muscular stature  HENT:      Head: Normocephalic and atraumatic  Right Ear: Tympanic membrane, ear canal and external ear normal  There is no impacted cerumen  Left Ear: Tympanic membrane, ear canal and external ear normal  There is no impacted cerumen  Eyes:      General:         Right eye: No discharge  Left eye: No discharge  Extraocular Movements: Extraocular movements intact  Conjunctiva/sclera: Conjunctivae normal       Pupils: Pupils are equal, round, and reactive to light  Neck:      Musculoskeletal: Neck supple  No pain with movement  Vascular: Normal carotid pulses  No carotid bruit  Cardiovascular:      Rate and Rhythm: Normal rate and regular rhythm  Pulses: Normal pulses  Heart sounds: Normal heart sounds  Pulmonary:      Effort: Pulmonary effort is normal       Breath sounds: Normal breath sounds  Abdominal:      General: Abdomen is flat  Bowel sounds are normal       Palpations: Abdomen is soft  Tenderness: There is no abdominal tenderness  Musculoskeletal:      Right lower leg: No edema  Left lower leg: No edema  Comments: Shoulder exam deferred to specialist   Lymphadenopathy:      Head:      Right side of head: No submandibular or tonsillar adenopathy  Left side of head: No submandibular or tonsillar adenopathy  Neurological:      Mental Status: He is alert and oriented to person, place, and time  Psychiatric:         Mood and Affect: Mood normal          Speech: Speech normal          Behavior: Behavior normal  Behavior is cooperative           Vitals:    03/11/21 1353   BP: 113/77   BP Location: Left arm   Patient Position: Sitting   Cuff Size: Standard Pulse: 88   Temp: 98 1 °F (36 7 °C)   TempSrc: Temporal   SpO2: 99%   Weight: 87 kg (191 lb 12 8 oz)   Height: 5' 6" (1 676 m)     BMI Counseling: Body mass index is 30 96 kg/m²  The BMI is above normal  Nutrition recommendations include reducing portion sizes, decreasing overall calorie intake, 3-5 servings of fruits/vegetables daily, reducing fast food intake, consuming healthier snacks, decreasing soda and/or juice intake, moderation in carbohydrate intake, increasing intake of lean protein, reducing intake of saturated fat and trans fat and reducing intake of cholesterol  Exercise recommendations include exercising 3-5 times per week

## 2021-03-29 ENCOUNTER — OFFICE VISIT (OUTPATIENT)
Dept: OBGYN CLINIC | Facility: OTHER | Age: 46
End: 2021-03-29
Payer: COMMERCIAL

## 2021-03-29 ENCOUNTER — APPOINTMENT (OUTPATIENT)
Dept: RADIOLOGY | Facility: OTHER | Age: 46
End: 2021-03-29
Payer: COMMERCIAL

## 2021-03-29 VITALS — WEIGHT: 191 LBS | BODY MASS INDEX: 30.83 KG/M2

## 2021-03-29 DIAGNOSIS — M25.512 CHRONIC LEFT SHOULDER PAIN: ICD-10-CM

## 2021-03-29 DIAGNOSIS — M25.512 ACUTE PAIN OF LEFT SHOULDER: ICD-10-CM

## 2021-03-29 DIAGNOSIS — M24.812 INTERNAL DERANGEMENT OF SHOULDER, LEFT: Primary | ICD-10-CM

## 2021-03-29 DIAGNOSIS — G89.29 CHRONIC LEFT SHOULDER PAIN: ICD-10-CM

## 2021-03-29 PROCEDURE — 73030 X-RAY EXAM OF SHOULDER: CPT

## 2021-03-29 PROCEDURE — 1036F TOBACCO NON-USER: CPT | Performed by: ORTHOPAEDIC SURGERY

## 2021-03-29 PROCEDURE — 99203 OFFICE O/P NEW LOW 30 MIN: CPT | Performed by: ORTHOPAEDIC SURGERY

## 2021-03-29 NOTE — PROGRESS NOTES
Assessment  Diagnoses and all orders for this visit:    Internal derangement of shoulder, left    Acute pain of left shoulder        Discussion and Plan:    Patient has an examination worrisome for rotator cuff pathology, patient was not able to tolerate physical therapy due to increased pain  He is indicated this time for an MRI scan to evaluate for surgical pathology  We will review the results of the study when it has been obtained and discuss further treatment options  Subjective:   Patient ID: Stephanie Sanchez is a 50 y o  male      HPI  The patient presents with a chief complaint of left shoulder pain  Patient is a competitive power   The pain began several years ago, however worsened in December when he was power lifting  The patient describes the pain as aching, dull and sharp in intensity,  intermittent in timing, and localizes the pain to the  left lateral shoulder  The pain is worse with movement and relieved by rest   The pain is associated with numbness and tingling  The pain is not associated with constitutional symptoms  The patient is awoken at night by the pain  The patient was referred to PT by his PCP  Patient states he was unable to tolerate physical therapy due to increased pain  The following portions of the patient's history were reviewed and updated as appropriate: allergies, current medications, past family history, past medical history, past social history, past surgical history and problem list     Review of Systems   Constitutional: Negative for chills and fever  HENT: Negative for drooling and hearing loss  Eyes: Negative for visual disturbance  Respiratory: Negative for cough and shortness of breath  Cardiovascular: Negative for chest pain  Gastrointestinal: Negative for abdominal pain  Skin: Negative for rash  Psychiatric/Behavioral: Negative for agitation  Objective:   Wt 86 6 kg (191 lb)   BMI 30 83 kg/m²       Left Shoulder Exam Muscle Strength   Abduction: 3/5   External rotation: 3/5     Other   Erythema: absent  Sensation: normal  Pulse: present     Comments:    Active ROM is limited today by pain  Full PROM            Physical Exam  Vitals signs reviewed  Constitutional:       Appearance: He is well-developed  HENT:      Head: Normocephalic  Eyes:      Pupils: Pupils are equal, round, and reactive to light  Pulmonary:      Effort: Pulmonary effort is normal    Skin:     General: Skin is warm and dry  I have personally reviewed pertinent films in PACS and my interpretation is as follows    Left shoulder x-rays demonstrates no fracture or dislocation      Scribe Attestation    I,:  Dimitris Rodriguez am acting as a scribe while in the presence of the attending physician :       I,:  Reid Maldonado MD personally performed the services described in this documentation    as scribed in my presence :

## 2021-04-13 ENCOUNTER — HOSPITAL ENCOUNTER (OUTPATIENT)
Dept: RADIOLOGY | Facility: HOSPITAL | Age: 46
Discharge: HOME | End: 2021-04-13
Attending: ORTHOPAEDIC SURGERY
Payer: COMMERCIAL

## 2021-04-13 DIAGNOSIS — M25.512 ACUTE PAIN OF LEFT SHOULDER: ICD-10-CM

## 2021-04-13 DIAGNOSIS — M24.812 INTERNAL DERANGEMENT OF SHOULDER, LEFT: ICD-10-CM

## 2021-04-13 PROCEDURE — 73221 MRI JOINT UPR EXTREM W/O DYE: CPT

## 2021-04-13 PROCEDURE — G1004 CDSM NDSC: HCPCS

## 2021-04-15 ENCOUNTER — APPOINTMENT (OUTPATIENT)
Dept: LAB | Facility: HOSPITAL | Age: 46
End: 2021-04-15
Payer: COMMERCIAL

## 2021-04-15 ENCOUNTER — TRANSCRIBE ORDERS (OUTPATIENT)
Dept: LAB | Facility: HOSPITAL | Age: 46
End: 2021-04-15

## 2021-04-15 DIAGNOSIS — Z13.220 SCREENING, LIPID: ICD-10-CM

## 2021-04-15 DIAGNOSIS — Z11.4 SCREENING FOR HIV (HUMAN IMMUNODEFICIENCY VIRUS): ICD-10-CM

## 2021-04-15 DIAGNOSIS — Z12.5 SCREENING PSA (PROSTATE SPECIFIC ANTIGEN): ICD-10-CM

## 2021-04-15 DIAGNOSIS — Z13.1 SCREENING FOR DIABETES MELLITUS: ICD-10-CM

## 2021-04-15 DIAGNOSIS — R32 URINARY INCONTINENCE, UNSPECIFIED TYPE: ICD-10-CM

## 2021-04-15 DIAGNOSIS — Z83.3 FAMILY HISTORY OF DIABETES MELLITUS: ICD-10-CM

## 2021-04-15 LAB
CHOLEST SERPL-MCNC: 203 MG/DL (ref 50–200)
EST. AVERAGE GLUCOSE BLD GHB EST-MCNC: 108 MG/DL
HBA1C MFR BLD: 5.4 %
HDLC SERPL-MCNC: 40 MG/DL
LDLC SERPL CALC-MCNC: 101 MG/DL (ref 0–100)
NONHDLC SERPL-MCNC: 163 MG/DL
TRIGL SERPL-MCNC: 311 MG/DL

## 2021-04-15 PROCEDURE — 87389 HIV-1 AG W/HIV-1&-2 AB AG IA: CPT

## 2021-04-15 PROCEDURE — 84154 ASSAY OF PSA FREE: CPT

## 2021-04-15 PROCEDURE — 83036 HEMOGLOBIN GLYCOSYLATED A1C: CPT

## 2021-04-15 PROCEDURE — 84153 ASSAY OF PSA TOTAL: CPT

## 2021-04-15 PROCEDURE — 36415 COLL VENOUS BLD VENIPUNCTURE: CPT

## 2021-04-15 PROCEDURE — 80061 LIPID PANEL: CPT

## 2021-04-16 LAB
HIV 1+2 AB+HIV1 P24 AG SERPL QL IA: NORMAL
PSA FREE MFR SERPL: 20.7 %
PSA FREE SERPL-MCNC: 0.29 NG/ML
PSA SERPL-MCNC: 1.4 NG/ML (ref 0–4)

## 2021-04-22 ENCOUNTER — OFFICE VISIT (OUTPATIENT)
Dept: OBGYN CLINIC | Facility: OTHER | Age: 46
End: 2021-04-22
Payer: COMMERCIAL

## 2021-04-22 VITALS
DIASTOLIC BLOOD PRESSURE: 82 MMHG | SYSTOLIC BLOOD PRESSURE: 124 MMHG | HEART RATE: 76 BPM | WEIGHT: 191 LBS | HEIGHT: 66 IN | BODY MASS INDEX: 30.7 KG/M2

## 2021-04-22 DIAGNOSIS — M75.102 TEAR OF LEFT SUPRASPINATUS TENDON: Primary | ICD-10-CM

## 2021-04-22 DIAGNOSIS — M25.512 ACUTE PAIN OF LEFT SHOULDER: ICD-10-CM

## 2021-04-22 PROCEDURE — 99214 OFFICE O/P EST MOD 30 MIN: CPT | Performed by: ORTHOPAEDIC SURGERY

## 2021-04-22 PROCEDURE — 1036F TOBACCO NON-USER: CPT | Performed by: ORTHOPAEDIC SURGERY

## 2021-04-22 PROCEDURE — 20610 DRAIN/INJ JOINT/BURSA W/O US: CPT | Performed by: ORTHOPAEDIC SURGERY

## 2021-04-22 RX ORDER — BUPIVACAINE HYDROCHLORIDE 2.5 MG/ML
2 INJECTION, SOLUTION INFILTRATION; PERINEURAL
Status: COMPLETED | OUTPATIENT
Start: 2021-04-22 | End: 2021-04-22

## 2021-04-22 RX ORDER — BETAMETHASONE SODIUM PHOSPHATE AND BETAMETHASONE ACETATE 3; 3 MG/ML; MG/ML
6 INJECTION, SUSPENSION INTRA-ARTICULAR; INTRALESIONAL; INTRAMUSCULAR; SOFT TISSUE
Status: COMPLETED | OUTPATIENT
Start: 2021-04-22 | End: 2021-04-22

## 2021-04-22 RX ADMIN — BETAMETHASONE SODIUM PHOSPHATE AND BETAMETHASONE ACETATE 6 MG: 3; 3 INJECTION, SUSPENSION INTRA-ARTICULAR; INTRALESIONAL; INTRAMUSCULAR; SOFT TISSUE at 15:32

## 2021-04-22 RX ADMIN — BUPIVACAINE HYDROCHLORIDE 2 ML: 2.5 INJECTION, SOLUTION INFILTRATION; PERINEURAL at 15:32

## 2021-04-22 NOTE — PATIENT INSTRUCTIONS

## 2021-04-22 NOTE — PROGRESS NOTES
Assessment  Diagnoses and all orders for this visit:    Tear of left supraspinatus tendon    Acute pain of left shoulder      Discussion and Plan:    The patient has an MRI showing high grade partial articular sided supraspinatus tendon tear  I have discussed with the patient the pathophysiology of this diagnosis and reviewed how the examination correlates with this diagnosis  Surgical vs conservative treatment options were discussed at length and after discussing these treatment options, the patient elected for CS injection  He declined a referral to PT as he has done physical therapy in the past and wishes to continue with his own routine  We did discuss surgery as arthroscopic repair of a high-grade partial articular sided supraspinatus tendon tear would be indicated if non-operative care is unable to achieve symptomatic improvement  The patient did let me know he is an avid power  and showed me a video of him bench pressing 450 lb 12 times just recently, I did explain to him that that is a bad prognostic sign for rotator cuff repair as that amount of stress could likely lead to further recurrent tear of the rotator cuff if repair is undertaken so he needs to consider that if he does wish to proceed forward with repair    Subjective:   Patient ID: Gail Murray is a 50 y o  male      HPI  Patient presents today to discuss the findings of his left shoulder MRI  Patient is a competitive power   The pain began several years ago, however worsened in December when he was power lifting  The patient describes the pain as aching, dull and sharp in intensity,  intermittent in timing, and localizes the pain to the  left lateral shoulder  The pain is worse with movement and relieved by rest   The pain is associated with numbness and tingling  The pain is not associated with constitutional symptoms  The patient is awoken at night by the pain          The following portions of the patient's history were reviewed and updated as appropriate: allergies, current medications, past family history, past medical history, past social history, past surgical history and problem list     Review of Systems   Constitutional: Negative for chills and fever  HENT: Negative for drooling and hearing loss  Eyes: Negative for visual disturbance  Respiratory: Negative for cough and shortness of breath  Cardiovascular: Negative for chest pain  Gastrointestinal: Negative for abdominal pain  Skin: Negative for rash  Psychiatric/Behavioral: Negative for agitation  Objective:  /82   Pulse 76   Ht 5' 6" (1 676 m)   Wt 86 6 kg (191 lb)   BMI 30 83 kg/m²       Left Shoulder Exam     Tenderness   The patient is experiencing no tenderness  Range of Motion   Forward flexion: 100 (Full PROM)     Muscle Strength   Abduction: 3/5   External rotation: 3/5             Physical Exam  Vitals signs reviewed  Constitutional:       Appearance: He is well-developed  HENT:      Head: Normocephalic  Eyes:      Pupils: Pupils are equal, round, and reactive to light  Pulmonary:      Effort: Pulmonary effort is normal    Skin:     General: Skin is warm and dry         Large joint arthrocentesis: L subacromial bursa  Universal Protocol:  Consent given by: patient  Patient understanding: patient states understanding of the procedure being performed  Site marked: the operative site was marked  Patient identity confirmed: verbally with patient    Supporting Documentation  Indications: pain   Procedure Details  Location: shoulder - L subacromial bursa  Needle size: 22 G  Ultrasound guidance: no  Approach: lateral  Medications administered: 2 mL bupivacaine 0 25 %; 6 mg betamethasone acetate-betamethasone sodium phosphate 6 (3-3) mg/mL    Patient tolerance: patient tolerated the procedure well with no immediate complications  Dressing:  Sterile dressing applied          I have personally reviewed pertinent films in PACS and my interpretation is as follows    MRI left shoulder demonstrates near full thickness supraspinatus tear      Scribe Attestation    I,:  Eugenia Eubanks am acting as a scribe while in the presence of the attending physician :       I,:  Greg Hunter MD personally performed the services described in this documentation    as scribed in my presence :

## 2021-04-28 ENCOUNTER — IMMUNIZATIONS (OUTPATIENT)
Dept: FAMILY MEDICINE CLINIC | Facility: HOSPITAL | Age: 46
End: 2021-04-28

## 2021-04-28 DIAGNOSIS — Z23 ENCOUNTER FOR IMMUNIZATION: Primary | ICD-10-CM

## 2021-04-28 PROCEDURE — 0001A SARS-COV-2 / COVID-19 MRNA VACCINE (PFIZER-BIONTECH) 30 MCG: CPT

## 2021-04-28 PROCEDURE — 91300 SARS-COV-2 / COVID-19 MRNA VACCINE (PFIZER-BIONTECH) 30 MCG: CPT

## 2021-04-29 ENCOUNTER — OFFICE VISIT (OUTPATIENT)
Dept: INTERNAL MEDICINE CLINIC | Facility: CLINIC | Age: 46
End: 2021-04-29

## 2021-04-29 VITALS
HEART RATE: 82 BPM | SYSTOLIC BLOOD PRESSURE: 124 MMHG | TEMPERATURE: 97.6 F | HEIGHT: 66 IN | DIASTOLIC BLOOD PRESSURE: 68 MMHG | BODY MASS INDEX: 31.21 KG/M2 | WEIGHT: 194.2 LBS

## 2021-04-29 DIAGNOSIS — R42 DIZZINESS: ICD-10-CM

## 2021-04-29 DIAGNOSIS — Z71.2 ENCOUNTER TO DISCUSS TEST RESULTS: Primary | ICD-10-CM

## 2021-04-29 DIAGNOSIS — E78.1 HYPERTRIGLYCERIDEMIA: ICD-10-CM

## 2021-04-29 DIAGNOSIS — R39.198 URINARY STREAM SLOWING: ICD-10-CM

## 2021-04-29 DIAGNOSIS — R35.1 NOCTURIA: ICD-10-CM

## 2021-04-29 PROCEDURE — 99214 OFFICE O/P EST MOD 30 MIN: CPT | Performed by: PHYSICIAN ASSISTANT

## 2021-04-29 PROCEDURE — 3008F BODY MASS INDEX DOCD: CPT | Performed by: ORTHOPAEDIC SURGERY

## 2021-04-29 PROCEDURE — 3008F BODY MASS INDEX DOCD: CPT | Performed by: PHYSICIAN ASSISTANT

## 2021-04-29 RX ORDER — MECLIZINE HYDROCHLORIDE 25 MG/1
25 TABLET ORAL 3 TIMES DAILY PRN
Qty: 30 TABLET | Refills: 0 | Status: SHIPPED | OUTPATIENT
Start: 2021-04-29 | End: 2021-08-03

## 2021-04-29 NOTE — PROGRESS NOTES
Assessment/Plan:      Diagnoses and all orders for this visit:    Urinary stream slowing  -     Ambulatory referral to Urology; Future    Nocturia  -     Ambulatory referral to Urology; Future    Dizziness  -     meclizine (ANTIVERT) 25 mg tablet; Take 1 tablet (25 mg total) by mouth 3 (three) times a day as needed for dizziness    Hypertriglyceridemia  -     Lipid panel; Future  -     Comprehensive metabolic panel; Future      46y/o male here today for review of labs, also some cocnerns today  Lab results as follows: Total chol 203, trigs 311, HDL 40,   A1C 5 4  Neg HIV  PSA 1 4    Discussed w/ pt low fat/low cholesterol diet, consider leaner meats ad foods, high protein but less sat/trans fats  Continue fish oil  Recheck lipids in 6 months  Referral to urology provided to further discuss urinary sxs  ? Side effect from workout supplements? Dizziness cause unknown, again, ? Side effect from workout supplement, dehydration  No sxs of cardiovascular problem, no CP or SOB  ? Dehydration  ? Vertigo  Trial meclizine TID x 2-3 days, then BID x 2 days, then once daily x 2 days  Increase water intake, avoid skipping meals  CMP/CBC not checked w/ recent BW as it was checked in ED in dec and was normal  Will check CMP and CBC again now to r/o anemia, electrolytes, kidneys, etc  Will monitor  Pt to continue following with ortho for shoulder  F/U in 6 months with CMP and lipids prior - fasting  CBC and CMP now  Will call with results, f/u sooner with any worsening sxs  Chief Complaint   Patient presents with    Follow-up     labs results,problem sleeping    Dizziness     when lay down and sitting,walking          Subjective:     Patient ID: Paco Malloy is a 50 y o  male     50y/o male here today for lab review and new concern of dizziness intermittent  Still has urinary issues, which he feels are getting worse   he noticed frequency of urination worse at night hen laying flat, better when he is elevated but then hurts his shoulder  Also notices slower stream at times  Ongoing left shoulder pain managed now by ortho  Had steroid inj  Which helped  He does note taking supplements for pre-workout and after working out he urinates 5-6 times  He has been taking vit B12 complex, ginsing, melatonin  He just started fish oil  He states he does eat a lot of cheese steaks and does eat higher fat foods, does not watch diet  Started with dizziness about a week ago  States he got out bed one AM he felt dizzy and almost fell into closet  states he gets multiple times a day, mainly position change lasting a few seconds each time  He states when he is doing "failure reps" at the gym, he has to stop to rest  He does get HA intermittent, hx of migraines, doesn't feel different than past migraines  Denies associated CP or SOB, lightheadedness or syncope  Review of Systems   Constitutional: Negative  Respiratory: Negative  Cardiovascular: Negative  Gastrointestinal: Negative  Genitourinary:        As in HPI   Musculoskeletal:        Left shoulder pain improved as in HPI   Neurological:        As in HPI   Psychiatric/Behavioral: Negative  The following portions of the patient's history were reviewed and updated as appropriate: allergies, current medications, past family history, past medical history, past social history, past surgical history and problem list       Objective:     Physical Exam  Vitals signs reviewed  Constitutional:       General: He is not in acute distress  Appearance: He is obese  He is not ill-appearing or toxic-appearing  Comments: BMI 31   HENT:      Head: Normocephalic and atraumatic  Right Ear: Tympanic membrane, ear canal and external ear normal       Left Ear: Tympanic membrane, ear canal and external ear normal    Eyes:      Extraocular Movements: Extraocular movements intact        Conjunctiva/sclera: Conjunctivae normal       Pupils: Pupils are equal, round, and reactive to light  Neck:      Musculoskeletal: Neck supple  Vascular: Normal carotid pulses  No carotid bruit  Cardiovascular:      Rate and Rhythm: Normal rate and regular rhythm  Pulses: Normal pulses  Heart sounds: Normal heart sounds  Pulmonary:      Effort: Pulmonary effort is normal       Breath sounds: Normal breath sounds  Musculoskeletal:      Right lower leg: No edema  Left lower leg: No edema  Comments: Shoulder exam deferred to ortho   Lymphadenopathy:      Head:      Right side of head: No submandibular or tonsillar adenopathy  Left side of head: No submandibular or tonsillar adenopathy  Neurological:      Mental Status: He is alert and oriented to person, place, and time  Motor: Motor function is intact  Coordination: Coordination is intact  Gait: Gait is intact  Psychiatric:         Mood and Affect: Mood normal          Behavior: Behavior normal  Behavior is cooperative           Vitals:    04/29/21 1433   BP: 124/68   BP Location: Left arm   Patient Position: Sitting   Cuff Size: Standard   Pulse: 82   Temp: 97 6 °F (36 4 °C)   TempSrc: Temporal   Weight: 88 1 kg (194 lb 3 2 oz)   Height: 5' 6" (1 676 m)

## 2021-05-19 ENCOUNTER — IMMUNIZATIONS (OUTPATIENT)
Dept: FAMILY MEDICINE CLINIC | Facility: HOSPITAL | Age: 46
End: 2021-05-19

## 2021-05-19 DIAGNOSIS — Z23 ENCOUNTER FOR IMMUNIZATION: Primary | ICD-10-CM

## 2021-05-19 PROCEDURE — 91300 SARS-COV-2 / COVID-19 MRNA VACCINE (PFIZER-BIONTECH) 30 MCG: CPT

## 2021-05-19 PROCEDURE — 0002A SARS-COV-2 / COVID-19 MRNA VACCINE (PFIZER-BIONTECH) 30 MCG: CPT

## 2021-06-29 ENCOUNTER — TELEPHONE (OUTPATIENT)
Dept: INTERNAL MEDICINE CLINIC | Facility: CLINIC | Age: 46
End: 2021-06-29

## 2021-06-29 ENCOUNTER — CLINICAL SUPPORT (OUTPATIENT)
Dept: INTERNAL MEDICINE CLINIC | Facility: CLINIC | Age: 46
End: 2021-06-29

## 2021-06-29 DIAGNOSIS — Z11.1 SCREENING FOR TUBERCULOSIS: Primary | ICD-10-CM

## 2021-06-29 PROCEDURE — 86580 TB INTRADERMAL TEST: CPT

## 2021-06-29 NOTE — TELEPHONE ENCOUNTER
Patient is here today for PPD placement  PPD placed in left forearm  Patient is not sure if he has any paperwork, he will check with his employer, [employer]    TB test was placed on 6/29/21 at 12:45 p m  Patient is aware to return for TB test reading on 7/1/21 after 12:45 or 7/2/21 before 12:45    Patient was provided a TB appointment reminder with this information

## 2021-07-01 ENCOUNTER — CLINICAL SUPPORT (OUTPATIENT)
Dept: INTERNAL MEDICINE CLINIC | Facility: CLINIC | Age: 46
End: 2021-07-01

## 2021-07-01 DIAGNOSIS — Z23 NEED FOR TETANUS BOOSTER: Primary | ICD-10-CM

## 2021-07-01 PROCEDURE — 90715 TDAP VACCINE 7 YRS/> IM: CPT | Performed by: PHYSICIAN ASSISTANT

## 2021-07-01 PROCEDURE — 90471 IMMUNIZATION ADMIN: CPT | Performed by: PHYSICIAN ASSISTANT

## 2021-07-01 NOTE — PROGRESS NOTES
PPD read on 7/1/2021 as negative, 0mm - patient also requested to have a TDAP, ok per Caryn Schmitt so I gave it in left deltoid

## 2021-07-02 NOTE — TELEPHONE ENCOUNTER
Late entry    Patient came into the office on 7/1/21 for PPD reading   Read as negative, 0 mm - copy of results given to patient

## 2021-07-08 ENCOUNTER — OFFICE VISIT (OUTPATIENT)
Dept: OBGYN CLINIC | Facility: OTHER | Age: 46
End: 2021-07-08
Payer: COMMERCIAL

## 2021-07-08 VITALS
DIASTOLIC BLOOD PRESSURE: 82 MMHG | SYSTOLIC BLOOD PRESSURE: 115 MMHG | HEART RATE: 93 BPM | BODY MASS INDEX: 31.21 KG/M2 | HEIGHT: 66 IN | WEIGHT: 194.22 LBS

## 2021-07-08 DIAGNOSIS — M75.102 TEAR OF LEFT SUPRASPINATUS TENDON: Primary | ICD-10-CM

## 2021-07-08 PROCEDURE — 20610 DRAIN/INJ JOINT/BURSA W/O US: CPT | Performed by: PHYSICIAN ASSISTANT

## 2021-07-08 PROCEDURE — 3008F BODY MASS INDEX DOCD: CPT | Performed by: PHYSICIAN ASSISTANT

## 2021-07-08 PROCEDURE — 99213 OFFICE O/P EST LOW 20 MIN: CPT | Performed by: PHYSICIAN ASSISTANT

## 2021-07-08 PROCEDURE — 1036F TOBACCO NON-USER: CPT | Performed by: PHYSICIAN ASSISTANT

## 2021-07-08 RX ORDER — METHYLPREDNISOLONE ACETATE 40 MG/ML
1 INJECTION, SUSPENSION INTRA-ARTICULAR; INTRALESIONAL; INTRAMUSCULAR; SOFT TISSUE
Status: COMPLETED | OUTPATIENT
Start: 2021-07-08 | End: 2021-07-08

## 2021-07-08 RX ORDER — BUPIVACAINE HYDROCHLORIDE 2.5 MG/ML
2 INJECTION, SOLUTION INFILTRATION; PERINEURAL
Status: COMPLETED | OUTPATIENT
Start: 2021-07-08 | End: 2021-07-08

## 2021-07-08 RX ADMIN — BUPIVACAINE HYDROCHLORIDE 2 ML: 2.5 INJECTION, SOLUTION INFILTRATION; PERINEURAL at 15:21

## 2021-07-08 RX ADMIN — METHYLPREDNISOLONE ACETATE 1 ML: 40 INJECTION, SUSPENSION INTRA-ARTICULAR; INTRALESIONAL; INTRAMUSCULAR; SOFT TISSUE at 15:21

## 2021-07-08 NOTE — PROGRESS NOTES
Assessment  Diagnoses and all orders for this visit:    Tear of left supraspinatus tendon    Other orders  -     Large joint arthrocentesis        Discussion and Plan:    1  Subacromial steroid injection - Depo secondary to patient insurance  2  Follow up as needed  3  Slowly resume all activities to tolerance  4  Continue with HEP as instructed by his therapist    Subjective:   Patient ID: Ashlyn Stinson is a 50 y o  male      Hamp Louise presents to the office in follow up of the right shoulder for his partial articular sided supraspinatus tendon tear  He was provided a steroid injection at last visit which helped  He has noticed more strength and motion of the shoulder  It continues to feel little stiff with overhead motion and has some discomfort with overhead exercises  Denies any limitations of the shoulder with ADLs or at work  Denies new injury or trauma  Denies numbness or tingling        The following portions of the patient's history were reviewed and updated as appropriate: allergies, current medications, past family history, past medical history, past social history, past surgical history and problem list     Review of Systems   Constitutional: Negative for chills and fever  HENT: Negative for hearing loss  Eyes: Negative for visual disturbance  Respiratory: Negative for shortness of breath  Cardiovascular: Negative for chest pain  Gastrointestinal: Negative for abdominal pain  Musculoskeletal:        As reviewed in the HPI   Skin: Negative for rash  Neurological:        As reviewed in the HPI   Psychiatric/Behavioral: Negative for agitation  Objective:  /82 (BP Location: Right arm, Patient Position: Sitting, Cuff Size: Adult)   Pulse 93   Ht 5' 6" (1 676 m)   Wt 88 1 kg (194 lb 3 6 oz)   BMI 31 35 kg/m²       Left Shoulder Exam     Tenderness   The patient is experiencing no tenderness       Range of Motion   Passive abduction: 90   External rotation: 40   Forward flexion: 160     Muscle Strength   External rotation: 5/5   Supraspinatus: 5/5     Tests   Fong test: positive  Impingement: negative  Drop arm: negative    Other   Erythema: absent  Sensation: normal  Pulse: present             Physical Exam  Constitutional:       Appearance: He is well-developed  HENT:      Head: Normocephalic  Pulmonary:      Effort: No respiratory distress  Breath sounds: No wheezing  Musculoskeletal:      Cervical back: Normal range of motion  Skin:     General: Skin is warm and dry  Neurological:      Mental Status: He is alert and oriented to person, place, and time  Psychiatric:         Behavior: Behavior normal          Thought Content: Thought content normal          Judgment: Judgment normal            Large joint arthrocentesis: L subacromial bursa  Universal Protocol:  Consent: Verbal consent obtained    Consent given by: patient    Supporting Documentation  Indications: pain   Procedure Details  Location: shoulder - L subacromial bursa  Preparation: Patient was prepped and draped in the usual sterile fashion  Needle size: 22 G  Approach: lateral  Medications administered: 2 mL bupivacaine 0 25 %; 1 mL methylPREDNISolone acetate 40 mg/mL    Patient tolerance: patient tolerated the procedure well with no immediate complications  Dressing:  Sterile dressing applied

## 2021-07-08 NOTE — PATIENT INSTRUCTIONS

## 2021-07-26 ENCOUNTER — TELEPHONE (OUTPATIENT)
Dept: INTERNAL MEDICINE CLINIC | Facility: CLINIC | Age: 46
End: 2021-07-26

## 2021-07-26 NOTE — TELEPHONE ENCOUNTER
Patient called requesting titers to be completed for employment  Patient needs mumps, measles, rubella & varicella completed   Please review

## 2021-07-27 NOTE — TELEPHONE ENCOUNTER
Patient called again to check status  Can labs please be ordered  Call patient when he go have it done

## 2021-07-27 NOTE — TELEPHONE ENCOUNTER
Orders placed per staff documentation patient requesting mumps, measles, rubella and varicella titers

## 2021-08-03 ENCOUNTER — OFFICE VISIT (OUTPATIENT)
Dept: INTERNAL MEDICINE CLINIC | Facility: CLINIC | Age: 46
End: 2021-08-03

## 2021-08-03 ENCOUNTER — APPOINTMENT (OUTPATIENT)
Dept: LAB | Facility: CLINIC | Age: 46
End: 2021-08-03
Payer: COMMERCIAL

## 2021-08-03 VITALS
WEIGHT: 197.2 LBS | DIASTOLIC BLOOD PRESSURE: 80 MMHG | BODY MASS INDEX: 38.72 KG/M2 | HEART RATE: 70 BPM | HEIGHT: 60 IN | SYSTOLIC BLOOD PRESSURE: 113 MMHG

## 2021-08-03 DIAGNOSIS — E78.1 HYPERTRIGLYCERIDEMIA: ICD-10-CM

## 2021-08-03 DIAGNOSIS — R35.1 NOCTURIA: Primary | ICD-10-CM

## 2021-08-03 DIAGNOSIS — Z01.84 IMMUNITY STATUS TESTING: ICD-10-CM

## 2021-08-03 LAB
ALBUMIN SERPL BCP-MCNC: 4 G/DL (ref 3.5–5)
ALP SERPL-CCNC: 83 U/L (ref 46–116)
ALT SERPL W P-5'-P-CCNC: 56 U/L (ref 12–78)
ANION GAP SERPL CALCULATED.3IONS-SCNC: 6 MMOL/L (ref 4–13)
AST SERPL W P-5'-P-CCNC: 21 U/L (ref 5–45)
BACTERIA UR QL AUTO: NORMAL /HPF
BILIRUB SERPL-MCNC: 0.41 MG/DL (ref 0.2–1)
BILIRUB UR QL STRIP: NEGATIVE
BUN SERPL-MCNC: 14 MG/DL (ref 5–25)
CALCIUM SERPL-MCNC: 9 MG/DL (ref 8.3–10.1)
CHLORIDE SERPL-SCNC: 104 MMOL/L (ref 100–108)
CHOLEST SERPL-MCNC: 234 MG/DL (ref 50–200)
CLARITY UR: CLEAR
CO2 SERPL-SCNC: 26 MMOL/L (ref 21–32)
COLOR UR: YELLOW
CREAT SERPL-MCNC: 1.15 MG/DL (ref 0.6–1.3)
GFR SERPL CREATININE-BSD FRML MDRD: 75 ML/MIN/1.73SQ M
GLUCOSE P FAST SERPL-MCNC: 89 MG/DL (ref 65–99)
GLUCOSE UR STRIP-MCNC: NEGATIVE MG/DL
HDLC SERPL-MCNC: 42 MG/DL
HGB UR QL STRIP.AUTO: NEGATIVE
HYALINE CASTS #/AREA URNS LPF: NORMAL /LPF
KETONES UR STRIP-MCNC: NEGATIVE MG/DL
LDLC SERPL CALC-MCNC: 124 MG/DL (ref 0–100)
LEUKOCYTE ESTERASE UR QL STRIP: NEGATIVE
NITRITE UR QL STRIP: NEGATIVE
NON-SQ EPI CELLS URNS QL MICRO: NORMAL /HPF
NONHDLC SERPL-MCNC: 192 MG/DL
PH UR STRIP.AUTO: 6 [PH]
POTASSIUM SERPL-SCNC: 3.6 MMOL/L (ref 3.5–5.3)
PROT SERPL-MCNC: 7.5 G/DL (ref 6.4–8.2)
PROT UR STRIP-MCNC: NEGATIVE MG/DL
RBC #/AREA URNS AUTO: NORMAL /HPF
RUBV IGG SERPL IA-ACNC: 1.5 IU/ML
SODIUM SERPL-SCNC: 136 MMOL/L (ref 136–145)
SP GR UR STRIP.AUTO: 1.02 (ref 1–1.03)
TRIGL SERPL-MCNC: 342 MG/DL
UROBILINOGEN UR QL STRIP.AUTO: 0.2 E.U./DL
WBC #/AREA URNS AUTO: NORMAL /HPF

## 2021-08-03 PROCEDURE — 36415 COLL VENOUS BLD VENIPUNCTURE: CPT

## 2021-08-03 PROCEDURE — 86765 RUBEOLA ANTIBODY: CPT

## 2021-08-03 PROCEDURE — 86735 MUMPS ANTIBODY: CPT

## 2021-08-03 PROCEDURE — 99213 OFFICE O/P EST LOW 20 MIN: CPT | Performed by: PHYSICIAN ASSISTANT

## 2021-08-03 PROCEDURE — 86762 RUBELLA ANTIBODY: CPT

## 2021-08-03 PROCEDURE — 86787 VARICELLA-ZOSTER ANTIBODY: CPT

## 2021-08-03 PROCEDURE — 80061 LIPID PANEL: CPT

## 2021-08-03 PROCEDURE — 80053 COMPREHEN METABOLIC PANEL: CPT

## 2021-08-03 PROCEDURE — 81001 URINALYSIS AUTO W/SCOPE: CPT | Performed by: PHYSICIAN ASSISTANT

## 2021-08-03 RX ORDER — OXYBUTYNIN CHLORIDE 5 MG/1
5 TABLET, EXTENDED RELEASE ORAL DAILY
Qty: 30 TABLET | Refills: 2 | Status: SHIPPED | OUTPATIENT
Start: 2021-08-03 | End: 2022-05-26 | Stop reason: ALTCHOICE

## 2021-08-03 NOTE — PATIENT INSTRUCTIONS
Today we discussed your symptom of urinary frequency, primarily nocturia which is urinary frequency more so at night  We discussed that your PSA level was 1 4 with recent blood work a few months ago which is decent  We discussed that I referred you to Urology back in April however you are unable to go to the appointment  You should have this rescheduled before leaving the office today here at the clinic so you can have your symptoms further addressed  We discussed that possibly something in your workout supplements may be causing some urinary symptoms so I would advise bringing your workout supplements to the urology visit with you just in case  We will collect a urine specimen from you to send out for urinalysis with microscopic  In the meantime while we are waiting for further workup for your urine as well as for you to see Urology, we can try oxybutynin which is a medicine that we used for urinary frequency and overactive bladder  I advised that you take 5 mg once a day to start and if appropriate and needing an increase in the dose, urology can reassess this

## 2021-08-03 NOTE — PROGRESS NOTES
Assessment/Plan:      Diagnoses and all orders for this visit:    Nocturia  -     Urinalysis with microscopic  -     oxybutynin (DITROPAN-XL) 5 mg 24 hr tablet; Take 1 tablet (5 mg total) by mouth daily      Today we discussed your symptom of urinary frequency, primarily nocturia which is urinary frequency more so at night  We discussed that your PSA level was 1 4 with recent blood work a few months ago which is decent  We discussed that I referred you to Urology back in April however you are unable to go to the appointment  You should have this rescheduled before leaving the office today here at the clinic so you can have your symptoms further addressed  We discussed that possibly something in your workout supplements may be causing some urinary symptoms so I would advise bringing your workout supplements to the urology visit with you just in case  We will collect a urine specimen from you to send out for urinalysis with microscopic  In the meantime while we are waiting for further workup for your urine as well as for you to see Urology, we can try oxybutynin which is a medicine that we used for urinary frequency and overactive bladder  I advised that you take 5 mg once a day to start and if appropriate and needing an increase in the dose, urology can reassess this  Chief Complaint   Patient presents with    Urinary Frequency     frequent urination, has no idea when this started       Subjective:     Patient ID: Natalie Bettencourt is a 48 y o  male     45y/o male here today for discussion of ongoign nocturia, for a while now  States he is frustrated because he wakes up around 3 x a night and is interrupting his sleep  States  A few months ago was going about 2 x a night  He has noticed if he also has urinary frequency during the day - states he does drink a lot of fluids during he day  He has not noticed weak stream  He has not noticed urinary hesitancy   Denies gross hematuria  Denies pelvic pain  I did already address concerns with him at appt in April 2021 - pt shceduled for urology appt here at clinic 6/1 but no showed  Review of Systems   Constitutional: Negative  Respiratory: Negative  Cardiovascular: Negative  Gastrointestinal: Negative  Genitourinary:        As in HPI   Neurological: Negative  The following portions of the patient's history were reviewed and updated as appropriate: allergies, current medications, past family history, past medical history, past social history, past surgical history and problem list       Objective:     Physical Exam  Vitals reviewed  Constitutional:       General: He is not in acute distress  Appearance: He is obese  He is not ill-appearing or toxic-appearing  Comments: Pt BMI 38 51 though pt has muscular physique    Cardiovascular:      Rate and Rhythm: Normal rate and regular rhythm  Heart sounds: Normal heart sounds  Pulmonary:      Effort: Pulmonary effort is normal       Breath sounds: Normal breath sounds  Abdominal:      General: Bowel sounds are normal  There is no distension  Tenderness: There is no abdominal tenderness  Musculoskeletal:      Cervical back: Neck supple  Neurological:      Mental Status: He is alert and oriented to person, place, and time     Psychiatric:         Mood and Affect: Mood normal          Behavior: Behavior normal          Vitals:    08/03/21 1054   BP: 113/80   BP Location: Left arm   Patient Position: Sitting   Cuff Size: Large   Pulse: 70   Weight: 89 4 kg (197 lb 3 2 oz)   Height: 5' (1 524 m)

## 2021-08-05 LAB
MEV IGG SER QL: NORMAL
MUV IGG SER QL: ABNORMAL
VZV IGG SER IA-ACNC: NORMAL

## 2021-08-20 ENCOUNTER — TELEPHONE (OUTPATIENT)
Dept: UROLOGY | Facility: MEDICAL CENTER | Age: 46
End: 2021-08-20

## 2021-08-20 NOTE — TELEPHONE ENCOUNTER
Please Triage [facility]  New Patient-     What is the reason for the patients appointment? Patient called stating he was referred to see Urology by [facility] Patient has frequency at night time   He gets up a lot times at night time and sometimes slow stream         Imaging/Lab Results:      Do we accept the patient's insurance or is the patient Self-Pay? Provider & Plan: [facility]   Member ID#: Has the patient had any previous urologist(s)?no        Have patient records been requested?in epic        Has the patient had any outside testing done?       Does the patient have a personal history of cancer?no       Patient can be reached at :[phone] (Z)

## 2021-08-20 NOTE — TELEPHONE ENCOUNTER
Left message stating patient an appointment has been tentatively scheduled for 8/30 at 10:15  Advised patient to call the office to confirm or deny the appointment

## 2021-08-25 ENCOUNTER — CLINICAL SUPPORT (OUTPATIENT)
Dept: INTERNAL MEDICINE CLINIC | Facility: CLINIC | Age: 46
End: 2021-08-25

## 2021-08-25 DIAGNOSIS — Z23 NEED FOR MMR VACCINE: Primary | ICD-10-CM

## 2021-08-25 PROCEDURE — 90471 IMMUNIZATION ADMIN: CPT | Performed by: INTERNAL MEDICINE

## 2021-08-25 PROCEDURE — 90707 MMR VACCINE SC: CPT | Performed by: INTERNAL MEDICINE

## 2021-08-25 NOTE — PROGRESS NOTES
Patient came into the office for MMR #1  Given for lack of immunity (see task under labs)  0 5 ml given in right arm SQ   Patient to return in 6 months for second dose

## 2021-09-08 ENCOUNTER — OFFICE VISIT (OUTPATIENT)
Dept: INTERNAL MEDICINE CLINIC | Facility: CLINIC | Age: 46
End: 2021-09-08

## 2021-09-08 VITALS
HEART RATE: 103 BPM | HEIGHT: 60 IN | TEMPERATURE: 98 F | OXYGEN SATURATION: 97 % | WEIGHT: 192 LBS | DIASTOLIC BLOOD PRESSURE: 52 MMHG | SYSTOLIC BLOOD PRESSURE: 80 MMHG | BODY MASS INDEX: 37.69 KG/M2

## 2021-09-08 DIAGNOSIS — G47.9 SLEEP DISTURBANCE: Primary | ICD-10-CM

## 2021-09-08 DIAGNOSIS — Z78.9 NOT IMMUNE TO RUBELLA: ICD-10-CM

## 2021-09-08 PROCEDURE — 99213 OFFICE O/P EST LOW 20 MIN: CPT | Performed by: PHYSICIAN ASSISTANT

## 2021-09-08 RX ORDER — TRAZODONE HYDROCHLORIDE 50 MG/1
50 TABLET ORAL
Qty: 30 TABLET | Refills: 2 | Status: SHIPPED | OUTPATIENT
Start: 2021-09-08 | End: 2021-10-21 | Stop reason: SDUPTHER

## 2021-09-08 NOTE — PROGRESS NOTES
Assessment/Plan:      Diagnoses and all orders for this visit:    Sleep disturbance  -     traZODone (DESYREL) 50 mg tablet; Take 1 tablet (50 mg total) by mouth daily at bedtime    Not immune to rubella      53-year-old male presenting today for discussion of sleep disturbances most likely as a result to new job working night shift  Risks versus benefits of medications were addressed the patient is agreeable to starting medication  I did discuss with patient appropriate sleep hygiene which includes darkening the room and avoiding any bright lights or stimulation from electronics and TV before sleep  He can take a nice warm shower, calming music, Lavender, etc  He is agreeable to starting trazodone 50 mg before bed in addition to his melatonin  Risks versus benefits, potential side effects discussed  He understands to give this a few weeks before he sees if it helps and may need an increase in dose as well  Patient concern as he states his job will not let him work if he has to wait 6 months to get his 2nd MMR  I am not sure who told him this however in his immunity blood work I would mention that he can get his 2nd MMR 4 weeks after the 1st per CDC  Patient understands this and will be scheduled at the end of the month to get his 2nd dose and we can provide him with a vaccine record at that time  Chief Complaint   Patient presents with    Follow-up     still not sleeping through the night  mumps rebella, work stating needs a letter stating the shot will be good for the time he has it       Subjective:     Patient ID: Susanna Noel is a 48 y o  male     47y/o male here today for discussion of poor sleep habits and discussion of 2nd MMR for work      states working overnights and finding difficulty with his sleeping patterns  States he feels his sleep is poor, difficulty falling asleep and staying a lseep, states very restless  He tries to watch TV before bed to relax       states was taking a sleep aid from "[employer]"  He does take melatonin 3mg a pill  Started working night shift 1 5 months ago  He also states he needs a letter stating he has to wait 6 months to get 2nd MMR vaccine as he was told when he was here to get first    He had to get immunity titers with mom's showing equivocal result in rubella showing no immunity  Review of Systems   Constitutional: Negative  Respiratory: Negative  Cardiovascular: Negative  Gastrointestinal: Negative  Genitourinary: Negative  Neurological: Negative  Psychiatric/Behavioral: Positive for sleep disturbance  The following portions of the patient's history were reviewed and updated as appropriate: allergies, current medications, past family history, past medical history, past social history, past surgical history and problem list       Objective:     Physical Exam  Vitals reviewed  Constitutional:       General: He is not in acute distress  Appearance: He is not ill-appearing or toxic-appearing  Cardiovascular:      Rate and Rhythm: Normal rate and regular rhythm  Heart sounds: Normal heart sounds  Comments: Low BP - slightly lower than pt baseline  He is asymptomatic  Pulmonary:      Effort: Pulmonary effort is normal       Breath sounds: Normal breath sounds  Musculoskeletal:      Cervical back: Neck supple  Neurological:      Mental Status: He is alert and oriented to person, place, and time  Psychiatric:         Mood and Affect: Mood normal          Behavior: Behavior normal          Thought Content:  Thought content normal          Vitals:    09/08/21 1532   BP: (!) 80/52   BP Location: Left arm   Patient Position: Sitting   Cuff Size: Large   Pulse: 103   Temp: 98 °F (36 7 °C)   TempSrc: Temporal   SpO2: 97%   Weight: 87 1 kg (192 lb)   Height: 5' (1 524 m)

## 2021-09-29 ENCOUNTER — CLINICAL SUPPORT (OUTPATIENT)
Dept: INTERNAL MEDICINE CLINIC | Facility: CLINIC | Age: 46
End: 2021-09-29

## 2021-09-29 DIAGNOSIS — Z23 NEED FOR MMR VACCINE: Primary | ICD-10-CM

## 2021-09-29 DIAGNOSIS — Z23 NEED FOR INFLUENZA VACCINATION: ICD-10-CM

## 2021-09-29 PROCEDURE — 90686 IIV4 VACC NO PRSV 0.5 ML IM: CPT | Performed by: INTERNAL MEDICINE

## 2021-09-29 PROCEDURE — 90707 MMR VACCINE SC: CPT | Performed by: INTERNAL MEDICINE

## 2021-09-29 PROCEDURE — 90471 IMMUNIZATION ADMIN: CPT | Performed by: INTERNAL MEDICINE

## 2021-09-29 PROCEDURE — 90472 IMMUNIZATION ADMIN EACH ADD: CPT | Performed by: INTERNAL MEDICINE

## 2021-10-19 ENCOUNTER — TELEMEDICINE (OUTPATIENT)
Dept: INTERNAL MEDICINE CLINIC | Facility: CLINIC | Age: 46
End: 2021-10-19

## 2021-10-19 DIAGNOSIS — J06.9 UPPER RESPIRATORY TRACT INFECTION, UNSPECIFIED TYPE: Primary | ICD-10-CM

## 2021-10-19 PROCEDURE — 99212 OFFICE O/P EST SF 10 MIN: CPT | Performed by: PHYSICIAN ASSISTANT

## 2021-10-19 RX ORDER — IBUPROFEN 600 MG/1
600 TABLET ORAL EVERY 6 HOURS PRN
Qty: 30 TABLET | Refills: 0 | Status: SHIPPED | OUTPATIENT
Start: 2021-10-19 | End: 2022-05-26 | Stop reason: ALTCHOICE

## 2021-10-19 RX ORDER — BENZONATATE 200 MG/1
200 CAPSULE ORAL 3 TIMES DAILY PRN
Qty: 30 CAPSULE | Refills: 0 | Status: SHIPPED | OUTPATIENT
Start: 2021-10-19 | End: 2022-05-26 | Stop reason: ALTCHOICE

## 2021-10-20 ENCOUNTER — OFFICE VISIT (OUTPATIENT)
Dept: URGENT CARE | Age: 46
End: 2021-10-20
Payer: COMMERCIAL

## 2021-10-20 ENCOUNTER — TELEPHONE (OUTPATIENT)
Dept: INTERNAL MEDICINE CLINIC | Facility: CLINIC | Age: 46
End: 2021-10-20

## 2021-10-20 VITALS — WEIGHT: 192.6 LBS | HEART RATE: 90 BPM | TEMPERATURE: 96.8 F | BODY MASS INDEX: 37.61 KG/M2 | OXYGEN SATURATION: 98 %

## 2021-10-20 DIAGNOSIS — Z11.59 SPECIAL SCREENING EXAMINATION FOR VIRAL DISEASE: Primary | ICD-10-CM

## 2021-10-20 DIAGNOSIS — J02.9 SORE THROAT: ICD-10-CM

## 2021-10-20 LAB — S PYO AG THROAT QL: NEGATIVE

## 2021-10-20 PROCEDURE — 99213 OFFICE O/P EST LOW 20 MIN: CPT | Performed by: PHYSICIAN ASSISTANT

## 2021-10-20 PROCEDURE — 87880 STREP A ASSAY W/OPTIC: CPT | Performed by: PHYSICIAN ASSISTANT

## 2021-10-20 PROCEDURE — U0005 INFEC AGEN DETEC AMPLI PROBE: HCPCS | Performed by: PHYSICIAN ASSISTANT

## 2021-10-20 PROCEDURE — U0003 INFECTIOUS AGENT DETECTION BY NUCLEIC ACID (DNA OR RNA); SEVERE ACUTE RESPIRATORY SYNDROME CORONAVIRUS 2 (SARS-COV-2) (CORONAVIRUS DISEASE [COVID-19]), AMPLIFIED PROBE TECHNIQUE, MAKING USE OF HIGH THROUGHPUT TECHNOLOGIES AS DESCRIBED BY CMS-2020-01-R: HCPCS | Performed by: PHYSICIAN ASSISTANT

## 2021-10-20 PROCEDURE — 87070 CULTURE OTHR SPECIMN AEROBIC: CPT | Performed by: PHYSICIAN ASSISTANT

## 2021-10-21 LAB — SARS-COV-2 RNA RESP QL NAA+PROBE: NEGATIVE

## 2021-10-23 LAB — BACTERIA THROAT CULT: NORMAL

## 2021-11-11 ENCOUNTER — OFFICE VISIT (OUTPATIENT)
Dept: OBGYN CLINIC | Facility: OTHER | Age: 46
End: 2021-11-11
Payer: COMMERCIAL

## 2021-11-11 VITALS
SYSTOLIC BLOOD PRESSURE: 127 MMHG | HEIGHT: 60 IN | HEART RATE: 76 BPM | WEIGHT: 194.2 LBS | DIASTOLIC BLOOD PRESSURE: 85 MMHG | BODY MASS INDEX: 38.13 KG/M2

## 2021-11-11 DIAGNOSIS — M75.102 TEAR OF LEFT SUPRASPINATUS TENDON: Primary | ICD-10-CM

## 2021-11-11 PROCEDURE — 1036F TOBACCO NON-USER: CPT | Performed by: PHYSICIAN ASSISTANT

## 2021-11-11 PROCEDURE — 20610 DRAIN/INJ JOINT/BURSA W/O US: CPT | Performed by: PHYSICIAN ASSISTANT

## 2021-11-11 PROCEDURE — 99213 OFFICE O/P EST LOW 20 MIN: CPT | Performed by: PHYSICIAN ASSISTANT

## 2021-11-11 PROCEDURE — 3008F BODY MASS INDEX DOCD: CPT | Performed by: PHYSICIAN ASSISTANT

## 2021-11-11 RX ORDER — METHYLPREDNISOLONE ACETATE 40 MG/ML
1 INJECTION, SUSPENSION INTRA-ARTICULAR; INTRALESIONAL; INTRAMUSCULAR; SOFT TISSUE
Status: COMPLETED | OUTPATIENT
Start: 2021-11-11 | End: 2021-11-11

## 2021-11-11 RX ORDER — BUPIVACAINE HYDROCHLORIDE 2.5 MG/ML
2 INJECTION, SOLUTION INFILTRATION; PERINEURAL
Status: COMPLETED | OUTPATIENT
Start: 2021-11-11 | End: 2021-11-11

## 2021-11-11 RX ADMIN — BUPIVACAINE HYDROCHLORIDE 2 ML: 2.5 INJECTION, SOLUTION INFILTRATION; PERINEURAL at 08:33

## 2021-11-11 RX ADMIN — METHYLPREDNISOLONE ACETATE 1 ML: 40 INJECTION, SUSPENSION INTRA-ARTICULAR; INTRALESIONAL; INTRAMUSCULAR; SOFT TISSUE at 08:33

## 2021-12-02 DIAGNOSIS — G47.9 SLEEP DISTURBANCE: ICD-10-CM

## 2021-12-02 RX ORDER — TRAZODONE HYDROCHLORIDE 50 MG/1
50 TABLET ORAL
Qty: 30 TABLET | Refills: 0 | Status: SHIPPED | OUTPATIENT
Start: 2021-12-02 | End: 2022-01-07 | Stop reason: SDUPTHER

## 2022-01-26 ENCOUNTER — TELEPHONE (OUTPATIENT)
Dept: INTERNAL MEDICINE CLINIC | Facility: CLINIC | Age: 47
End: 2022-01-26

## 2022-01-26 DIAGNOSIS — G47.9 SLEEP DISTURBANCE: ICD-10-CM

## 2022-01-26 RX ORDER — TRAZODONE HYDROCHLORIDE 50 MG/1
50 TABLET ORAL
Qty: 90 TABLET | Refills: 0 | OUTPATIENT
Start: 2022-01-26

## 2022-01-26 NOTE — TELEPHONE ENCOUNTER
Requested medication(s) are due for refill today: No  Patient has already received a courtesy refill: No  Other reason request has been forwarded to provider: Dr Edith Sarmiento I called the patient to [phone] a couple of min ago (3:40 pm), patient states he needs the prescription again because per patient " his employer find out he's taking that medication and the container was opened with all the pills spill out in his bag and they fired him"  Patient states he's out of the medication  Per patient this situation happen today   Thanks

## 2022-01-26 NOTE — TELEPHONE ENCOUNTER
traZODone (DESYREL) 50 mg tablet    Is it considered a barbituant and what would happen it taken in large doses  Is it considered a scheduled narcotic  Patient is having a situation at work  Patient is being let go because the employer states that it's a scheduled narcotic  Please check into this and call the patient ASAP to advise      thanks

## 2022-01-26 NOTE — TELEPHONE ENCOUNTER
There is already a telephone encounter out for this same situation so please refer to that as well  This is not a narcotic or controlled substance as patient was asking about this in the telephone encounter documentation  Also I am confused about his current situation  I am not sure why he can not use the pills he has if they do not in his back  Also the prescription was just filled for 90 pills on 01/07 with 3 refills so in the instance that the pills were not salvageable or contaminated he can call the pharmacy to get his early refill however he may have to pay out-of-pocket

## 2022-01-27 NOTE — TELEPHONE ENCOUNTER
Please tell patient that I have letter in his chart noting that the trazodone is being prescribed by me and is not a controlled substance or monitor narcotics  However please explain to patient that I am certain in larger quantities can be harmful to someone if they take too many pills at 1 time  Also remind patient that this medication is only being prescribed to take before bed and he does not need to have it on him otherwise

## 2022-01-27 NOTE — TELEPHONE ENCOUNTER
Patient called back and I advised him of the information  He states that it rolled out of his bag at work and they advised him that because it was found on the floor the kids could have gotten a hold of the medication and taken it and it could have done harm  Patient works at [employer] and was fired from his job because of this  Patient is fighting to get his job back and is wondering if he can get a letter explaining that this is not a controlled substance and that it is taken at bedtime to help with sleep  Patient states that he also needs a refill on this medication as they took the bottle and would not give it back to him  Once letter is generated patient would like it sent via e-mail to: [email or web address]

## 2022-01-31 NOTE — TELEPHONE ENCOUNTER
Patient spoke to insurance and they stated we need to place a prior auth for Trazadone  Please place prior auth and update patient on status  Thank you

## 2022-01-31 NOTE — TELEPHONE ENCOUNTER
Pt called for status of request  Pt picked up the script on 1/8/22  Per patient, his employer confiscated his pills and has not returned them  I explained to the patient that he can either call his insurance and request a medication override or call the pharmacy and try to get a script filled out of pocket       Pt verbalized understanding and had no further questions

## 2022-02-02 NOTE — TELEPHONE ENCOUNTER
Lmom for patient to call back  Have been unsuccessful reaching patient, will send letter for patient to call office

## 2022-02-06 ENCOUNTER — OFFICE VISIT (OUTPATIENT)
Dept: URGENT CARE | Age: 47
End: 2022-02-06
Payer: MEDICARE

## 2022-02-06 VITALS
HEIGHT: 67 IN | HEART RATE: 85 BPM | BODY MASS INDEX: 29.03 KG/M2 | TEMPERATURE: 97.6 F | WEIGHT: 185 LBS | OXYGEN SATURATION: 98 % | RESPIRATION RATE: 20 BRPM

## 2022-02-06 DIAGNOSIS — B34.9 VIRAL ILLNESS: Primary | ICD-10-CM

## 2022-02-06 PROCEDURE — 99213 OFFICE O/P EST LOW 20 MIN: CPT | Performed by: PHYSICIAN ASSISTANT

## 2022-02-06 PROCEDURE — U0003 INFECTIOUS AGENT DETECTION BY NUCLEIC ACID (DNA OR RNA); SEVERE ACUTE RESPIRATORY SYNDROME CORONAVIRUS 2 (SARS-COV-2) (CORONAVIRUS DISEASE [COVID-19]), AMPLIFIED PROBE TECHNIQUE, MAKING USE OF HIGH THROUGHPUT TECHNOLOGIES AS DESCRIBED BY CMS-2020-01-R: HCPCS | Performed by: PHYSICIAN ASSISTANT

## 2022-02-06 PROCEDURE — U0005 INFEC AGEN DETEC AMPLI PROBE: HCPCS | Performed by: PHYSICIAN ASSISTANT

## 2022-02-06 RX ORDER — BROMPHENIRAMINE MALEATE, PSEUDOEPHEDRINE HYDROCHLORIDE, AND DEXTROMETHORPHAN HYDROBROMIDE 2; 30; 10 MG/5ML; MG/5ML; MG/5ML
10 SYRUP ORAL 4 TIMES DAILY PRN
Qty: 120 ML | Refills: 0 | Status: SHIPPED | OUTPATIENT
Start: 2022-02-06 | End: 2022-05-26 | Stop reason: ALTCHOICE

## 2022-02-07 LAB — SARS-COV-2 RNA RESP QL NAA+PROBE: NEGATIVE

## 2022-02-07 NOTE — PATIENT INSTRUCTIONS
Take cough medicine as directed as needed for symptom relief  Motrin and/or Tylenol as needed for fevers body aches headaches and pain  Follow up with PCP in 3-5 days  Proceed to  ER if symptoms worsen  COVID-19 (Coronavirus Disease 2019)   AMBULATORY CARE:   Coronavirus disease 2019 (COVID-19)  is the disease caused by a coronavirus first discovered in December 2019  Coronaviruses generally cause upper respiratory (nose, throat, and lung) infections, such as a cold  The new virus spreads quickly and easily  The virus can be spread starting 2 days before symptoms even begin  The virus has also changed into several new forms (called variants) since it was discovered  The variants may be more contagious (easily spread) than the original form  Some may also cause more severe illness than others  It is important to follow local, national, and worldwide measures to protect yourself and others from infection  Signs and symptoms of COVID-19  may not develop  Signs and symptoms that develop usually start about 5 days after infection but can take 2 to 14 days  You may feel like you have the flu or a bad cold  Some signs and symptoms go away in a few days  Others can last weeks, months, or possibly years  Information on COVID-19 is still being learned   Tell your healthcare provider if you think you were infected but develop signs or symptoms not listed below:  · A cough    · Shortness of breath or trouble breathing that may become severe    · A fever of at least 100 4°F, or 38°C (may be lower in adults 65 or older)    · Chills that might include shaking    · Muscle pain, body aches, or a headache    · A sore throat    · Suddenly not being able to taste or smell anything    · Feeling mentally and physically tired (fatigue)    · Congestion (stuffy head and nose), or a runny nose    · Diarrhea, nausea, or vomiting    If you think you or someone you know may be infected:  Do the following to protect others:  · If emergency care is needed,  tell the  about the possible infection, or call ahead and tell the emergency department  · Call a healthcare provider  for instructions if symptoms are mild  Anyone who may be infected should not  arrive without calling first  The provider will need to protect staff members and other patients  · The person who may be infected needs to wear a face covering  while getting medical care  This will help lower the risk of infecting others  Coverings are not used for anyone who is younger than 2 years, has breathing problems, or cannot remove it  The provider can give you instructions for anyone who cannot wear a covering  Call your local emergency number (911 in the [address] [place] [address],[address]) or an emergency department if:   · You have trouble breathing or shortness of breath at rest     · You have chest pain or pressure that lasts longer than 5 minutes  · You become confused or hard to wake  · Your lips or face are blue  · You have a fever of 104°F (40°C) or higher  Call your doctor if:   · You do not  have symptoms of COVID-19 but had close physical contact within 14 days with someone who tested positive  · You have questions or concerns about your condition or care  How COVID-19 is diagnosed: If you think you have COVID-19, call your healthcare provider  He or she will tell you what to do based on your symptoms and testing guidelines in your area  In general, the following may be used:  · A viral test  shows if you have a current infection  Samples are taken from your nose and throat, usually with swabs  You may need to wait several days to get the test results  Your healthcare provider will tell you how to get your results  You will need to quarantine (stay physically away from others) until you get your results  If results show you have COVID-19, you will need to continue until you are well  Your provider or other health official may give you more directions   You will also need to prevent another infection until it is known if you can get COVID-19 again  · An antibody test  shows if you had a past infection  Blood samples are used for this test  Antibodies are made by your immune system to attack the virus that causes COVID-19  Antibodies will form 1 to 3 weeks after you are infected  It is not known if antibodies prevent a second infection, or for how long a person might be protected  If you have antibodies, you will still need to be careful around others until more is known  · CT scans or x-rays  may be used to check for signs of pneumonia  The 2019 coronavirus causes a specific kind of pneumonia, usually in both lungs  The pictures may also be used to check for health problems in other parts of your body  Treatment  such as monoclonal antibodies and convalescent plasma have emergency use authorization (EUA)  This means they may be given only to patients who are hospitalized with severe signs and symptoms  The following may be used to manage your symptoms:  · Mild symptoms  may get better on their own  If you do not need to be treated in a hospital, you will be given instructions to use at home  Your condition will be closely monitored  You will need to watch for worsening symptoms and seek immediate care if needed  Talk to your healthcare provider about the following:    ? Decongestants  help reduce nasal congestion and help you breathe more easily  If you take decongestant pills, they may make you feel restless or cause problems with your sleep  Do not use decongestant sprays for more than a few days  ? Cough suppressants  help reduce coughing  Ask your healthcare provider which type of cough medicine is best for you  ? To soothe a sore throat,  gargle with warm salt water, or use throat lozenges or a throat spray  Drink more liquids to thin and loosen mucus and to prevent dehydration      ? NSAIDs or acetaminophen  can help lower a fever and relieve body aches or a headache  Follow directions  If not taken correctly, NSAIDs can cause kidney damage and acetaminophen can cause liver damage  · Severe or life-threatening symptoms  are treated in the hospital  You may need a combination of the following:    ? Medicines  may be given to lower or prevent inflammation or to fight the virus  You may also need blood thinners to prevent or treat blood clots  If you have a deep vein thrombosis (DVT) or pulmonary embolism (PE), you may need to keep using blood thinners for 3 months  ? Extra oxygen  may be given if you have respiratory failure  This means your lungs cannot get enough oxygen into your blood and out to your organs  Extra oxygen can help prevent organ failure  ? A ventilator  may be used to help you breathe  What you need to know about health problems the virus may cause:  Serious health problems may improve or continue for weeks, months, and possibly years  Health problems that continue may be called long COVID  Anyone can develop serious problems from this virus, but your risk is higher if you are 72 or older  A weak immune system, diabetes, or a heart or lung condition can also increase your risk  Your risk is also higher if you are a current or former cigarette smoker  COVID-19 can lead to any of the following:  · Multisymptom inflammatory syndrome in adults (MIS-A) or in children (MIS-C), causing inflammation in the heart, digestive system, skin, or brain    · Serious lower respiratory conditions, such as pneumonia or acute respiratory distress syndrome (ARDS)    · Blood vessel damage, leading to blood clots    · Organ damage from a lack of oxygen or from blood clots    · Sleep problems    · Problems thinking clearly, remembering information, or concentrating    · Mood changes, depression, or anxiety    What you need to know about COVID-19 vaccines:  Get a vaccine even if you already had COVID-19  · COVID-19 vaccines are given as a shot in 1 or 2 doses    Some vaccines have emergency use authorization (EUA)  An EUA means the vaccine is not approved but is given because the benefits outweigh the risks  A 2-dose vaccine is fully approved for use in those 16 years or older  This vaccine also has an EUA for adolescents 12 to 15 years  Your healthcare provider can help you understand the benefits and risks  · A third dose is recommended for adults with a weakened immune system who get a 2-dose vaccine  The third dose is given at least 28 days after the second  · Even after you get the vaccine, continue social distancing and other measures  Experts are still learning how well the vaccines work to prevent infection, transmission, and severe illness  Although rare, you can become infected after you get the vaccine  You may also be able to pass the virus to others without knowing you are infected  · After you get the vaccine, check local, national, and international travel rules  Check to see if you need to be tested before you travel  You may also need to quarantine after you return  Some countries require proof of a negative test before you leave  You should also be tested 3 to 5 days after you return from another country  How the 2019 coronavirus spreads: The following are ways the virus is thought to spread, but more information may be coming:  · Droplets are the main way all coronaviruses spread  The virus travels in droplets that form when a person talks, coughs, or sneezes  The droplets can also float in the air for minutes or hours  Infection happens when you breathe in the droplets or get them in your eyes or nose  Close personal contact with an infected person increases your risk for infection  This means being within 6 feet (2 meters) of the person for at least 15 minutes over 24 hours  · Person-to-person contact can spread the virus  For example, a person with the virus on his or her hands can spread it by shaking hands with someone      · The virus can stay on objects and surfaces for a short time  You may become infected by touching the object or surface and then touching your eyes or mouth  · An infected animal may be able to infect a person who touches it  This may happen at live markets or on a farm  Help lower the risk for COVID-19:  The best way to prevent infection is to avoid anyone who is infected, but this can be hard to do  An infected person can spread the virus before signs or symptoms begin, or even if signs or symptoms never develop  The following can help lower the risk for infection:      · Wash your hands often throughout the day  Use soap and water  Rub your soapy hands together, lacing your fingers, for at least 20 seconds  Rinse with warm, running water  Dry your hands with a clean towel or paper towel  Use hand  that contains alcohol if soap and water are not available  Teach children how to wash their hands and use hand   · Cover sneezes and coughs  Turn your face away and cover your mouth and nose with a tissue  Throw the tissue away  Use the bend of your arm if a tissue is not available  Then wash your hands well with soap and water or use hand   Teach children how to cover a cough or sneeze  · Wear a face covering (mask) around anyone who does not live in your home  Use a cloth covering with at least 2 layers  You can also create layers by putting a cloth covering over a disposable non-medical mask  Cover your mouth and your nose  The covering should fit snugly against the bridge of your nose  Securely fasten it under your chin and on the sides of your face  Do not  wear a plastic face shield instead of a covering  Continue social distancing and washing your hands often  A face covering is not a substitute for social distancing safety measures  · Follow worldwide, national, and local social distancing guidelines  Keep at least 6 feet (2 meters) between you and others   Also keep this distance from anyone who comes to your home, such as someone making a delivery  Wear a face covering while you are around others  You will need to wear a covering in restaurants, stores, and other public buildings  You will also need a covering on mass transit, such as a bus, subway, or airplane  Remember to use a covering made from thick material or wear 2 coverings together  · Make a habit of not touching your face  If you get the virus on your hands, you can transfer it to your eyes, nose, or mouth and become infected  You can also transfer it to objects, surfaces, or people  Do not touch your eyes, nose, or mouth without washing your hands first     · Clean and disinfect high-touch surfaces and objects often  Use disinfecting wipes, or make a solution of 4 teaspoons of bleach in 1 quart (4 cups) of water  Clean and disinfect even if you think no one living in or coming to your home is infected with the virus  · Ask about other vaccines you may need  Get the influenza (flu) vaccine as soon as recommended each year, usually starting in September or October  Get the pneumonia vaccine if recommended  Your healthcare provider can tell you if you should also get other vaccines, and when to get them  Follow social distancing guidelines:  National and local social distancing rules vary  Rules may change over time as restrictions are lifted  Restrictions may return if an outbreak happens where you live  It is important to know and follow all current social distancing rules in your area  The following are general guidelines:  · Stay home if you are sick or think you may have COVID-19  It is important to stay home if you are waiting for a testing appointment or for test results  Even if you do not have symptoms, you can pass the virus to others  · Limit trips out of your home  Have food, medicines, and other supplies delivered and left at your door or other area, if possible   Plan trips out of your home so you make the fewest stops possible to limit close personal contact  Keep track of places you go  This will help contact tracers notify others if you become infected  · Avoid close physical contact with anyone who does not live in your home  Do not shake hands with, hug, or kiss a person as a greeting  If you must use public transportation (such as a bus or subway), try to sit or stand away from others  Only allow necessary people into your home  Wear your face covering, and remind others to wear a face covering  Remind them to wash their hands when they arrive and before they leave  Do not  let someone into your home or go to someone's home just to visit  Even if you both do not feel sick, the virus can pass from one of you to the other  · Avoid in-person gatherings and crowds  Gatherings or crowds of 10 or more individuals can cause the virus to spread  Avoid places such as sparrow, beaches, sporting events, and tourist attractions  For events such as parties, holiday meals, [employer] services, and conferences, attend virtually (on a computer), if possible  · Ask your healthcare provider for other ways to have appointments  Some providers offer phone, video, or other types of appointments  You may also be able to get prescriptions for a few months of your medicines at a time  · Stay safe if you must go out to work  Keep physical distance between you and other workers as much as possible  Follow your employer's rules so everyone stays safe  If you have COVID-19 and are recovering at home,  healthcare providers will give you specific instructions to follow  The following are general guidelines to remind you how to keep others safe until you are well:  · Wash your hands often  Use soap and water as much as possible  Use hand  that contains alcohol if soap and water are not available  Dry your hands with a clean towel or paper towel  Do not share towels with anyone   If you use paper towels, throw them away in a lined trash can kept in your room or area  Use a covered trash can, if possible  · Do not go out of your home unless it is necessary  Ask someone who is not infected to go out for groceries or supplies, or have them delivered  Do not go to your healthcare provider's office without an appointment  · Only have close physical contact with a person giving direct care, or a baby or child you must care for  Family members and friends should not visit you  If possible, stay in a separate area or room of your home if you live with others  No one should go into the area or room except to give you care  You can visit with others by phone, video chat, e-mail, or similar systems  · Wear a face covering while others are near you  This can help prevent droplets from spreading the virus when you talk, sneeze, or cough  Put the covering on before anyone comes into your room or area  Remind the person to cover his or her nose and mouth before coming in to provide care for you  · Do not share items  Do not share dishes, towels, or other items with anyone  Items need to be washed after you use them  · Protect your baby  Some newborns have tested positive for the virus  It is not known if they became infected before or after birth  The highest risk is when a  has close contact with an infected person  If you are pregnant or breastfeeding, talk to your healthcare provider or obstetrician about any concerns you have  He or she will tell you when to bring your baby in for check-ups and vaccines  He or she will also tell you what to do if you think your baby was infected with the coronavirus  Wash your hands and put on a clean face covering before you breastfeed or care for your baby  · Do not handle live animals unless it is necessary  Some animals, including pets, have been infected with the new coronavirus   Ask someone who is not infected to take care of your pet until you are well  If you must care for a pet, wear a face covering  Wash your hands before and after you give care  Talk to your healthcare provider about how to keep a service animal safe, if needed  · Follow directions from your healthcare provider for being around others after you recover  It is not known if or for how long a recovered person can pass the virus to others  Your provider may give you instructions, such as continuing social distancing and wearing a face covering  He or she will tell you when it is okay to be around others again  This may be 10 to 20 days after symptoms started or you had a positive test  Most symptoms will also need to be gone  Your provider will give you more information  Follow up with your doctor as directed:  Write down your questions so you remember to ask them during your visits  For more information:   · Centers for Disease Control and Prevention  [address] [place] , [address]  Phone: 6- 839 - 180-9519  Web Address: [employer]    © [address] [place] [address] 2021 Information is for End User's use only and may not be sold, redistributed or otherwise used for commercial purposes  All illustrations and images included in CareNotes® are the copyrighted property of A D A M [employer] Inc  or [address] [place], [place] [address]shiraz   The above information is an  only  It is not intended as medical advice for individual conditions or treatments  Talk to your doctor, nurse or pharmacist before following any medical regimen to see if it is safe and effective for you

## 2022-02-07 NOTE — PROGRESS NOTES
[facility]        NAME: Anayeli Robb is a 48 y o  male  : 1975    MRN: [id number]  DATE: 2022  TIME: 8:00 PM    Assessment and Plan   Viral illness [B34 9]  1  Viral illness  COVID Only -Office Collect    brompheniramine-pseudoephedrine-DM 30-2-10 MG/5ML syrup         Patient Instructions     Take cough medicine as directed as needed for symptom relief  Motrin and/or Tylenol as needed for fevers body aches headaches and pain  Follow up with PCP in 3-5 days  Proceed to  ER if symptoms worsen  Chief Complaint     Chief Complaint   Patient presents with    Cough     cough, nasal congestion began yesterday         History of Present Illness       23-year-old male presents with runny nose congestion cough S started yesterday  Denies any abdominal pain nausea vomiting diarrhea  Having mild sore throat  Denies any shortness of breath or chest pain  No loss of taste or smell  Mild headaches  Cough  This is a new problem  The current episode started yesterday  The problem has been waxing and waning  The problem occurs constantly  The cough is non-productive  Associated symptoms include headaches, nasal congestion, postnasal drip, rhinorrhea and a sore throat  Pertinent negatives include no chest pain, chills, ear congestion, ear pain, fever, hemoptysis or myalgias  Nothing aggravates the symptoms  He has tried nothing for the symptoms  The treatment provided no relief  There is no history of asthma  Review of Systems   Review of Systems   Constitutional: Negative  Negative for chills and fever  HENT: Positive for postnasal drip, rhinorrhea and sore throat  Negative for ear pain  Eyes: Negative  Respiratory: Positive for cough  Negative for hemoptysis  Cardiovascular: Negative  Negative for chest pain  Gastrointestinal: Negative  Musculoskeletal: Negative  Negative for myalgias  Skin: Negative  Neurological: Positive for headaches           Current Medications       Current Outpatient Medications:     traZODone (DESYREL) 50 mg tablet, Take 1 tablet (50 mg total) by mouth daily at bedtime, Disp: 90 tablet, Rfl: 3    benzonatate (TESSALON) 200 MG capsule, Take 1 capsule (200 mg total) by mouth 3 (three) times a day as needed for cough (Patient not taking: Reported on 2/6/2022 ), Disp: 30 capsule, Rfl: 0    brompheniramine-pseudoephedrine-DM 30-2-10 MG/5ML syrup, Take 10 mL by mouth 4 (four) times a day as needed for congestion or cough, Disp: 120 mL, Rfl: 0    ibuprofen (MOTRIN) 600 mg tablet, Take 1 tablet (600 mg total) by mouth every 6 (six) hours as needed (throat pain) (Patient not taking: Reported on 2/6/2022 ), Disp: 30 tablet, Rfl: 0    oxybutynin (DITROPAN-XL) 5 mg 24 hr tablet, Take 1 tablet (5 mg total) by mouth daily (Patient not taking: Reported on 10/19/2021), Disp: 30 tablet, Rfl: 2    Current Allergies     Allergies as of 02/06/2022    (No Known Allergies)            The following portions of the patient's history were reviewed and updated as appropriate: allergies, current medications, past family history, past medical history, past social history, past surgical history and problem list      History reviewed  No pertinent past medical history  Past Surgical History:   Procedure Laterality Date    APPENDECTOMY      MOUTH SURGERY         Family History   Problem Relation Age of Onset    Diabetes Mother     Hypertension Father     No Known Problems Brother          Medications have been verified  Objective   Pulse 85   Temp 97 6 °F (36 4 °C)   Resp 20   Ht 5' 7" (1 702 m)   Wt 83 9 kg (185 lb)   SpO2 98%   BMI 28 98 kg/m²   No LMP for male patient  Physical Exam     Physical Exam  Vitals and nursing note reviewed  Constitutional:       General: He is not in acute distress  Appearance: Normal appearance  He is well-developed  HENT:      Head: Normocephalic and atraumatic        Right Ear: Hearing, tympanic membrane, ear canal and external ear normal  There is no impacted cerumen  Left Ear: Hearing, tympanic membrane, ear canal and external ear normal  There is no impacted cerumen  Nose: Congestion and rhinorrhea present  Mouth/Throat:      Pharynx: Uvula midline  No oropharyngeal exudate  Eyes:      General:         Right eye: No discharge  Left eye: No discharge  Conjunctiva/sclera: Conjunctivae normal    Cardiovascular:      Rate and Rhythm: Normal rate and regular rhythm  Heart sounds: Normal heart sounds  No murmur heard  Pulmonary:      Effort: Pulmonary effort is normal  No respiratory distress  Breath sounds: Normal breath sounds  No wheezing or rales  Abdominal:      General: Bowel sounds are normal       Palpations: Abdomen is soft  Tenderness: There is no abdominal tenderness  Musculoskeletal:         General: Normal range of motion  Cervical back: Normal range of motion and neck supple  Lymphadenopathy:      Cervical: No cervical adenopathy  Skin:     General: Skin is warm and dry  Neurological:      Mental Status: He is alert and oriented to person, place, and time     Psychiatric:         Mood and Affect: Mood normal

## 2022-04-06 ENCOUNTER — OFFICE VISIT (OUTPATIENT)
Dept: INTERNAL MEDICINE CLINIC | Facility: CLINIC | Age: 47
End: 2022-04-06

## 2022-04-06 VITALS
BODY MASS INDEX: 29.95 KG/M2 | DIASTOLIC BLOOD PRESSURE: 84 MMHG | TEMPERATURE: 98.3 F | WEIGHT: 191.2 LBS | SYSTOLIC BLOOD PRESSURE: 127 MMHG | OXYGEN SATURATION: 96 % | HEART RATE: 76 BPM

## 2022-04-06 DIAGNOSIS — R20.2 RIGHT HAND PARESTHESIA: ICD-10-CM

## 2022-04-06 DIAGNOSIS — R20.2 LEFT HAND PARESTHESIA: Primary | ICD-10-CM

## 2022-04-06 PROCEDURE — 99213 OFFICE O/P EST LOW 20 MIN: CPT | Performed by: INTERNAL MEDICINE

## 2022-04-06 RX ORDER — GABAPENTIN 100 MG/1
100 CAPSULE ORAL
Qty: 28 CAPSULE | Refills: 0 | Status: SHIPPED | OUTPATIENT
Start: 2022-04-06 | End: 2022-05-26 | Stop reason: ALTCHOICE

## 2022-04-06 NOTE — PROGRESS NOTES
[facility] Visit Note  Vita Ford 48 y o  male   MRN: [id number]    Assessment and Plan      Diagnoses and all orders for this visit:    Left hand paresthesia  -     gabapentin (Neurontin) 100 mg capsule; Take 1 capsule (100 mg total) by mouth daily at bedtime  -     Cock Up Wrist Splint    Right hand paresthesia  -     Cock Up Wrist Splint      Patient with bilateral hand paraesthesias, much worse on the left hand  C8 vs ulnar nerve dermatomal distribution  No arm involvement, suggesting a distal issue  Possibly nerve compression due to his heavy lifting and stress on his wrist  Will trial low dose gabapentin and give wrist splints for both hands  Recommend follow up in 6 weeks and can consider EMG studies if discomfort is not improved with splints pharmaceutical therapy  Schedule a follow-up appointment in 6 weeks for f/u of hand discomfort  Chief Complaint: I have pins and needles of my left hand  Subjective     History of Present Illness:  Vita Ford is a 48 y o  male with a past medical history of left chest/shoulder trauma two years ago who presents to the clinic today for hand tingling of both hands, but mostly his left  Patient presents today with two weeks of intermittent posterior left hand tingling that includes his posterior aspect of his 3rd, 4th, and 5th digits  Symptoms usually at night but sometimes during the day  Shaking his hand seems to help with the sensation  He reports no weakness, no recent trauma to his neck, shoulder, or wrist  He lifts heavy weights and is training for a weight lifting competition  He uses a wrist brace when he bench presses and thinks that this might be causing his discomfort  He hasn't tried any medications for this  Review of Systems   Constitutional: Negative for chills and fever  HENT: Negative for ear pain and sore throat  Eyes: Negative for pain and visual disturbance  Respiratory: Negative for cough and shortness of breath  Cardiovascular: Negative for chest pain and palpitations  Gastrointestinal: Negative for abdominal pain and vomiting  Genitourinary: Negative for dysuria and hematuria  Musculoskeletal: Negative for arthralgias and back pain  Skin: Negative for color change and rash  Neurological: Negative for seizures and syncope  All other systems reviewed and are negative  Current Outpatient Medications:     ibuprofen (MOTRIN) 600 mg tablet, Take 1 tablet (600 mg total) by mouth every 6 (six) hours as needed (throat pain), Disp: 30 tablet, Rfl: 0    traZODone (DESYREL) 50 mg tablet, Take 1 tablet (50 mg total) by mouth daily at bedtime, Disp: 90 tablet, Rfl: 3    benzonatate (TESSALON) 200 MG capsule, Take 1 capsule (200 mg total) by mouth 3 (three) times a day as needed for cough (Patient not taking: Reported on 2/6/2022 ), Disp: 30 capsule, Rfl: 0    brompheniramine-pseudoephedrine-DM 30-2-10 MG/5ML syrup, Take 10 mL by mouth 4 (four) times a day as needed for congestion or cough (Patient not taking: Reported on 4/6/2022 ), Disp: 120 mL, Rfl: 0    gabapentin (Neurontin) 100 mg capsule, Take 1 capsule (100 mg total) by mouth daily at bedtime, Disp: 28 capsule, Rfl: 0    oxybutynin (DITROPAN-XL) 5 mg 24 hr tablet, Take 1 tablet (5 mg total) by mouth daily (Patient not taking: Reported on 10/19/2021), Disp: 30 tablet, Rfl: 2  History reviewed  No pertinent past medical history    Past Surgical History:   Procedure Laterality Date    APPENDECTOMY      MOUTH SURGERY       Social History     Socioeconomic History    Marital status: Single     Spouse name: Not on file    Number of children: Not on file    Years of education: Not on file    Highest education level: Not on file   Occupational History    Not on file   Tobacco Use    Smoking status: Never Smoker    Smokeless tobacco: Never Used   Vaping Use    Vaping Use: Never used   Substance and Sexual Activity    Alcohol use: Not Currently     Comment: occ    Drug use: No    Sexual activity: Yes     Partners: Female   Other Topics Concern    Not on file   Social History Narrative    Not on file     Social Determinants of Health     Financial Resource Strain: Low Risk     Difficulty of Paying Living Expenses: Not hard at all   Food Insecurity: No Food Insecurity    Worried About Running Out of Food in the Last Year: Never true    Arnoldo of Food in the Last Year: Never true   Transportation Needs: No Transportation Needs    Lack of Transportation (Medical): No    Lack of Transportation (Non-Medical): No   Physical Activity: Sufficiently Active    Days of Exercise per Week: 3 days    Minutes of Exercise per Session: 150+ min   Stress: No Stress Concern Present    Feeling of Stress :  Only a little   Social Connections: Socially Isolated    Frequency of Communication with Friends and Family: Once a week    Frequency of Social Gatherings with Friends and Family: Once a week    Attends Religion Services: Never    Active Member of Clubs or Organizations: No    Attends Club or Organization Meetings: Never    Marital Status: Never    Intimate Partner Violence: Not At Risk    Fear of Current or Ex-Partner: No    Emotionally Abused: No    Physically Abused: No    Sexually Abused: No   Housing Stability: Low Risk     Unable to Pay for Housing in the Last Year: No    Number of [place] in the Last Year: 1    Unstable Housing in the Last Year: No     Family History   Problem Relation Age of Onset    Diabetes Mother     Hypertension Father     No Known Problems Brother      No Known Allergies    Objective     Vitals:    04/06/22 1130   BP: 127/84   BP Location: Left arm   Patient Position: Sitting   Cuff Size: Large   Pulse: 76   Temp: 98 3 °F (36 8 °C)   TempSrc: Temporal   SpO2: 96%   Weight: 86 7 kg (191 lb 3 2 oz)       Physical exam:     GENERAL: NAD   HEENT:  NC/AT, PERRL, EOMI, no scleral icterus  CARDIAC:  RRR, +S1/S2, no S3/S4 heard, no m/g/r  PULMONARY:  CTA B/L, no wheezing/rales/rhonci, non-labored breathing  ABDOMEN:  Soft, NT/ND,no rebound/guarding/rigidity  Extremities:  No edema, cyanosis, or clubbing, no tenderness to palpation of the cervical or thoracic spine, no paraspinal tenderness, no tenderness to shoulder or elbow or wrist  Strength of bilateral upper extremities intact  Hand strength intact  Reflexes intact  NEUROLOGIC: Grossly intact  SKIN:  No rashes or erythema noted on exposed skin  Psych: Normal affect      Bishop Rosalva MD   PGY-1 Internal Medicine  [place]    ==  PLEASE NOTE:  This encounter was completed utilizing the [employer] Direct Speech Voice Recognition Software  Grammatical errors, random word insertions, pronoun errors and incomplete sentences are occasional consequences of the system due to software limitations, ambient noise and hardware issues  These may be missed by proof reading prior to affixing electronic signature  Any questions or concerns about the content, text or information contained within the body of this dictation should be directly addressed to the physician for clarification  Please do not hesitate to call me directly if you have any any questions or concerns

## 2022-05-19 ENCOUNTER — TELEPHONE (OUTPATIENT)
Dept: INTERNAL MEDICINE CLINIC | Facility: CLINIC | Age: 47
End: 2022-05-19

## 2022-05-26 ENCOUNTER — OFFICE VISIT (OUTPATIENT)
Dept: OBGYN CLINIC | Facility: OTHER | Age: 47
End: 2022-05-26
Payer: MEDICARE

## 2022-05-26 VITALS
HEIGHT: 67 IN | DIASTOLIC BLOOD PRESSURE: 82 MMHG | SYSTOLIC BLOOD PRESSURE: 121 MMHG | BODY MASS INDEX: 28.88 KG/M2 | HEART RATE: 71 BPM | WEIGHT: 184 LBS

## 2022-05-26 DIAGNOSIS — M75.102 TEAR OF LEFT SUPRASPINATUS TENDON: Primary | ICD-10-CM

## 2022-05-26 PROCEDURE — 99214 OFFICE O/P EST MOD 30 MIN: CPT | Performed by: PHYSICIAN ASSISTANT

## 2022-05-26 PROCEDURE — 20610 DRAIN/INJ JOINT/BURSA W/O US: CPT | Performed by: PHYSICIAN ASSISTANT

## 2022-05-26 RX ORDER — BUPIVACAINE HYDROCHLORIDE 2.5 MG/ML
2 INJECTION, SOLUTION INFILTRATION; PERINEURAL
Status: COMPLETED | OUTPATIENT
Start: 2022-05-26 | End: 2022-05-26

## 2022-05-26 RX ORDER — METHYLPREDNISOLONE ACETATE 40 MG/ML
1 INJECTION, SUSPENSION INTRA-ARTICULAR; INTRALESIONAL; INTRAMUSCULAR; SOFT TISSUE
Status: COMPLETED | OUTPATIENT
Start: 2022-05-26 | End: 2022-05-26

## 2022-05-26 RX ADMIN — METHYLPREDNISOLONE ACETATE 1 ML: 40 INJECTION, SUSPENSION INTRA-ARTICULAR; INTRALESIONAL; INTRAMUSCULAR; SOFT TISSUE at 13:18

## 2022-05-26 RX ADMIN — BUPIVACAINE HYDROCHLORIDE 2 ML: 2.5 INJECTION, SOLUTION INFILTRATION; PERINEURAL at 13:18

## 2022-05-26 NOTE — PATIENT INSTRUCTIONS

## 2022-05-26 NOTE — PROGRESS NOTES
Assessment  Diagnoses and all orders for this visit:    Tear of left supraspinatus tendon      Discussion and Plan:    Loi Hendrickson has responded well to steroid injections  He is not interested in surgical intervention  He will have his power lifting competition next month  1  Subacromial steroid injection - depo  2  Tylenol PRN  3  Activities to tolerance  4  Continue with therapist-guided HEP  5  Follow up PRN - he is aware injections must be spaced 4-6 months  Last injection provided >4 months of relief    Subjective:   Patient ID: Leighann Mittal is a 48 y o  male      Loi Hendrickson presents to the office in follow up of the left shoulder  He was last seen in November 2021 and provided a steroid injection for pain relief  He reports improvement with the steroid injection  He has the most pain with pushing motions - particularly overhead motion  Denies pain with pulling and bench press  Pain improves with rest   Admits to pain at night - depending on which side he sleeps on  Loi Hendrickson has been compliant with his HEP  Denies new injury or trauma  The following portions of the patient's history were reviewed and updated as appropriate: allergies, current medications, past family history, past medical history, past social history, past surgical history and problem list     Review of Systems   Constitutional: Negative for chills and fever  HENT: Negative for hearing loss  Eyes: Negative for visual disturbance  Respiratory: Negative for shortness of breath  Cardiovascular: Negative for chest pain  Gastrointestinal: Negative for abdominal pain  Musculoskeletal:        As reviewed in the HPI   Skin: Negative for rash  Neurological:        As reviewed in the HPI   Psychiatric/Behavioral: Negative for agitation  Objective:  /82   Pulse 71   Ht 5' 7" (1 702 m)   Wt 83 5 kg (184 lb)   BMI 28 82 kg/m²       Left Shoulder Exam     Tenderness   The patient is experiencing no tenderness  Range of Motion   Passive abduction: normal   External rotation: 30 (equivalent)   Forward flexion: 160   Internal rotation 0 degrees: normal     Muscle Strength   External rotation: 5/5   Supraspinatus: 5/5     Tests   Fong test: positive  Impingement: positive  Drop arm: negative    Other   Erythema: absent  Sensation: normal  Pulse: present     Comments:  Neg speeds            Physical Exam  Constitutional:       Appearance: He is well-developed  HENT:      Head: Normocephalic  Pulmonary:      Effort: No respiratory distress  Breath sounds: No wheezing  Musculoskeletal:      Cervical back: Normal range of motion  Skin:     General: Skin is warm and dry  Neurological:      Mental Status: He is alert and oriented to person, place, and time  Psychiatric:         Behavior: Behavior normal          Thought Content: Thought content normal          Judgment: Judgment normal        Large joint arthrocentesis: L subacromial bursa  Universal Protocol:  Consent: Verbal consent obtained    Consent given by: patient    Supporting Documentation  Indications: pain   Procedure Details  Location: shoulder - L subacromial bursa  Preparation: Patient was prepped and draped in the usual sterile fashion  Needle size: 22 G  Approach: lateral  Medications administered: 2 mL bupivacaine 0 25 %; 1 mL methylPREDNISolone acetate 40 mg/mL    Patient tolerance: patient tolerated the procedure well with no immediate complications  Dressing:  Sterile dressing applied

## 2022-06-01 NOTE — TELEPHONE ENCOUNTER
Folder Color- Red    Name of Form- Physician Respose Required    Form to be filled out by- Donya Cavanaugh    Form to be Faxed to [facility] [phone]    Patient made aware of 10 business day policy 
This form is not needed, his Tramadol prior [facility] was already approved
Abdominal Pain, N/V/D

## 2022-07-26 DIAGNOSIS — G47.9 SLEEP DISTURBANCE: ICD-10-CM

## 2022-07-27 RX ORDER — TRAZODONE HYDROCHLORIDE 50 MG/1
50 TABLET ORAL
Qty: 90 TABLET | Refills: 3 | Status: SHIPPED | OUTPATIENT
Start: 2022-07-27

## 2022-07-27 NOTE — TELEPHONE ENCOUNTER
Celestina England is here today for Establish Care (Fatigue, joint stiffness, thyroid ) and Physical    Concerns/symptoms: CPE, multiple concerns  Medications: currently is not taking any medications  Refills needed today? No     Tobacco history: verified  Advanced Directives: No not on file, not interested.  BP greater than 140/90? No    Patient would like communication of their results via:      Cell Phone:   Telephone Information:   Mobile [phone]     Okay to leave a message containing results? Yes  Preferred language:  English.    Health Maintenance Due   Topic Date Due   • Colorectal Cancer Screen-  Never done      Patient is due for the topics as listed above and wishes to proceed with them. Orders placed for Colorectal Cancer Screening: Colonoscopy..    Medicare HRA:              PHQ 2:  Date Adult PHQ 2 Score Adult PHQ 2 Interpretation   7/27/2022 0 No further screening needed       PHQ 9:        Patient returned our call but I was unavailable to take the call  Called patient back he did not answer- lmom awaiting a call back

## 2022-07-27 NOTE — TELEPHONE ENCOUNTER
From: Gail Murray  To: Office of David Gomez MD  Sent: 7/26/2022 7:11 PM EDT  Subject: Medication Renewal Request    Refills have been requested for the following medications:    Other - Gabapentin/trazodone    Preferred pharmacy: [facility] [address]

## 2022-08-26 ENCOUNTER — TELEPHONE (OUTPATIENT)
Dept: INTERNAL MEDICINE CLINIC | Facility: CLINIC | Age: 47
End: 2022-08-26

## 2022-08-26 NOTE — TELEPHONE ENCOUNTER
Immunization summary printed and placed in orange clerical folder to be emailed to patient, has PPD administration on it

## 2022-08-26 NOTE — TELEPHONE ENCOUNTER
Patient called requesting that he get the Tuberculin Skin Test-PPD Intradermal6/29/2021  report sent to his email  [email or web address]  To be submitted to a place of employment    Please check into this, print out the report and send to patient's email address    thanks

## 2022-10-13 ENCOUNTER — OFFICE VISIT (OUTPATIENT)
Dept: OBGYN CLINIC | Facility: OTHER | Age: 47
End: 2022-10-13
Payer: MEDICARE

## 2022-10-13 VITALS
SYSTOLIC BLOOD PRESSURE: 116 MMHG | HEIGHT: 67 IN | WEIGHT: 188.4 LBS | DIASTOLIC BLOOD PRESSURE: 80 MMHG | BODY MASS INDEX: 29.57 KG/M2 | HEART RATE: 85 BPM

## 2022-10-13 DIAGNOSIS — M75.102 TEAR OF LEFT SUPRASPINATUS TENDON: Primary | ICD-10-CM

## 2022-10-13 PROCEDURE — 99214 OFFICE O/P EST MOD 30 MIN: CPT | Performed by: PHYSICIAN ASSISTANT

## 2022-10-13 PROCEDURE — 20610 DRAIN/INJ JOINT/BURSA W/O US: CPT | Performed by: PHYSICIAN ASSISTANT

## 2022-10-13 RX ORDER — BUPIVACAINE HYDROCHLORIDE 2.5 MG/ML
2 INJECTION, SOLUTION INFILTRATION; PERINEURAL
Status: COMPLETED | OUTPATIENT
Start: 2022-10-13 | End: 2022-10-13

## 2022-10-13 RX ORDER — BETAMETHASONE SODIUM PHOSPHATE AND BETAMETHASONE ACETATE 3; 3 MG/ML; MG/ML
6 INJECTION, SUSPENSION INTRA-ARTICULAR; INTRALESIONAL; INTRAMUSCULAR; SOFT TISSUE
Status: COMPLETED | OUTPATIENT
Start: 2022-10-13 | End: 2022-10-13

## 2022-10-13 RX ADMIN — BUPIVACAINE HYDROCHLORIDE 2 ML: 2.5 INJECTION, SOLUTION INFILTRATION; PERINEURAL at 09:08

## 2022-10-13 RX ADMIN — BETAMETHASONE SODIUM PHOSPHATE AND BETAMETHASONE ACETATE 6 MG: 3; 3 INJECTION, SUSPENSION INTRA-ARTICULAR; INTRALESIONAL; INTRAMUSCULAR; SOFT TISSUE at 09:08

## 2022-10-13 NOTE — PATIENT INSTRUCTIONS

## 2022-10-13 NOTE — PROGRESS NOTES
Assessment  Diagnoses and all orders for this visit:    Tear of left supraspinatus tendon        Discussion and Plan:    Joon's symptoms are consistent with impingement  A steroid injection was provided for relief  He will continue with his HEP  Halima Duncan will slowly resume activities to his tolerance over next 24 hours  Follow up as needed for the left shoulder  Halima Duncan has some back pain from previous disk herniation  He asked about providers to see and I suggested he reach out to spine and pain or another pain management physician about treatment  Subjective:   Patient ID: Zully Serrano is a 50 y o  male      Halima Duncan returns to the office in follow up of his left shoulder  He received a steroid injection 4 5 months ago  Reports good relief with the injection  Pain used to bother him with all pushing and overhead activities - he admits this has improved  Pain improves with rest   The most notable symptom is pain that interferes with sleep  Denies new injury or trauma  Denies numbness or tingling  The following portions of the patient's history were reviewed and updated as appropriate: allergies, current medications, past family history, past medical history, past social history, past surgical history and problem list     Review of Systems   Constitutional: Negative for chills and fever  HENT: Negative for hearing loss  Eyes: Negative for visual disturbance  Respiratory: Negative for shortness of breath  Cardiovascular: Negative for chest pain  Gastrointestinal: Negative for abdominal pain  Musculoskeletal:        As reviewed in the HPI   Skin: Negative for rash  Neurological:        As reviewed in the HPI   Psychiatric/Behavioral: Negative for agitation         Objective:  /80 (BP Location: Left arm, Patient Position: Sitting, Cuff Size: Adult)   Pulse 85   Ht 5' 7" (1 702 m)   Wt 85 5 kg (188 lb 6 4 oz)   BMI 29 51 kg/m²       Left Shoulder Exam     Tenderness   The patient is experiencing no tenderness  Range of Motion   The patient has normal left shoulder ROM  Muscle Strength   External rotation: 5/5   Supraspinatus: 5/5   Subscapularis: 5/5     Tests   Fong test: positive  Impingement: positive  Drop arm: negative    Other   Erythema: absent  Sensation: normal  Pulse: present           Physical Exam  Constitutional:       Appearance: He is well-developed  HENT:      Head: Normocephalic  Pulmonary:      Effort: No respiratory distress  Breath sounds: No wheezing  Musculoskeletal:      Cervical back: Normal range of motion  Skin:     General: Skin is warm and dry  Neurological:      Mental Status: He is alert and oriented to person, place, and time  Psychiatric:         Behavior: Behavior normal          Thought Content: Thought content normal          Judgment: Judgment normal        Large joint arthrocentesis: L subacromial bursa  Universal Protocol:  Consent: Verbal consent obtained    Consent given by: patient    Supporting Documentation  Indications: pain   Procedure Details  Location: shoulder - L subacromial bursa  Preparation: Patient was prepped and draped in the usual sterile fashion  Needle size: 22 G  Ultrasound guidance: no  Approach: lateral  Medications administered: 6 mg betamethasone acetate-betamethasone sodium phosphate 6 (3-3) mg/mL; 2 mL bupivacaine 0 25 %    Patient tolerance: patient tolerated the procedure well with no immediate complications  Dressing:  Sterile dressing applied

## 2022-11-07 ENCOUNTER — APPOINTMENT (OUTPATIENT)
Dept: LAB | Facility: CLINIC | Age: 47
End: 2022-11-07

## 2022-11-07 ENCOUNTER — OFFICE VISIT (OUTPATIENT)
Dept: INTERNAL MEDICINE CLINIC | Facility: CLINIC | Age: 47
End: 2022-11-07

## 2022-11-07 VITALS
DIASTOLIC BLOOD PRESSURE: 85 MMHG | TEMPERATURE: 97.8 F | HEIGHT: 67 IN | SYSTOLIC BLOOD PRESSURE: 131 MMHG | WEIGHT: 186 LBS | BODY MASS INDEX: 29.19 KG/M2 | HEART RATE: 77 BPM

## 2022-11-07 DIAGNOSIS — R20.2 PARESTHESIAS: ICD-10-CM

## 2022-11-07 DIAGNOSIS — Z12.11 ENCOUNTER FOR SCREENING COLONOSCOPY: ICD-10-CM

## 2022-11-07 DIAGNOSIS — R20.2 PARESTHESIAS: Primary | ICD-10-CM

## 2022-11-07 LAB
ALBUMIN SERPL BCP-MCNC: 3.8 G/DL (ref 3.5–5)
ALP SERPL-CCNC: 72 U/L (ref 46–116)
ALT SERPL W P-5'-P-CCNC: 34 U/L (ref 12–78)
ANION GAP SERPL CALCULATED.3IONS-SCNC: 5 MMOL/L (ref 4–13)
AST SERPL W P-5'-P-CCNC: 17 U/L (ref 5–45)
BILIRUB SERPL-MCNC: 0.49 MG/DL (ref 0.2–1)
BUN SERPL-MCNC: 18 MG/DL (ref 5–25)
CALCIUM SERPL-MCNC: 9.5 MG/DL (ref 8.3–10.1)
CHLORIDE SERPL-SCNC: 106 MMOL/L (ref 96–108)
CO2 SERPL-SCNC: 26 MMOL/L (ref 21–32)
CREAT SERPL-MCNC: 1.35 MG/DL (ref 0.6–1.3)
EST. AVERAGE GLUCOSE BLD GHB EST-MCNC: 108 MG/DL
FOLATE SERPL-MCNC: 14.2 NG/ML (ref 3.1–17.5)
GFR SERPL CREATININE-BSD FRML MDRD: 61 ML/MIN/1.73SQ M
GLUCOSE P FAST SERPL-MCNC: 91 MG/DL (ref 65–99)
HBA1C MFR BLD: 5.4 %
HCV AB SER QL: NORMAL
POTASSIUM SERPL-SCNC: 4 MMOL/L (ref 3.5–5.3)
PROT SERPL-MCNC: 7.8 G/DL (ref 6.4–8.4)
SODIUM SERPL-SCNC: 137 MMOL/L (ref 135–147)
TSH SERPL DL<=0.05 MIU/L-ACNC: 1.54 UIU/ML (ref 0.45–4.5)
VIT B12 SERPL-MCNC: 854 PG/ML (ref 100–900)

## 2022-11-07 NOTE — PROGRESS NOTES
[address] [facility] [place] Visit Note  Lisa Villarreal 50 y o  male   MRN: [id number]    Assessment and Plan      Diagnoses and all orders for this visit:    Paresthesias  -     Comprehensive metabolic panel; Future  -     TSH, 3rd generation with Free T4 reflex; Future  -     Vitamin B12/Folate, Serum Panel; Future  -     Vitamin B1, whole blood; Future  -     Hepatitis C antibody; Future  -     Vitamin B6; Future  -     Vitamin E; Future  -     Hemoglobin A1C; Future  -     Nerve conduction test; Future    · Likely 2/2 to patient's strenuous weight lifting regiment  Will order the above panel to rule out additional metabolic causes of glove/stocking paraesthesia  · Nerve conduction test as well, patient given phone number and will schedule  · Can consider supportive therapy with gabapentin and reduction of weight training intensities if the above panel is negative  Encounter for screening colonoscopy  -     Ambulatory referral for colonoscopy; Future      Health Maintenance:  PHQ-2 score of 0 today  Colonoscopy screening ordered    Schedule a follow-up appointment in 6 months for AWV  Chief Complaint: "My hands and feet get numb"  Subjective     History of Present Illness:  Lisa Villarreal is a 50 y o  male with a past medical history of left elbow cubital tunnel syndrome following with Ortho who presents to the clinic today for tingling of his hands and feet  He has had this tingling in his hands for a while, but has just recently began feeling it in his feet  Intermittent  Worse at night and when he's in bed  Has seen this office for his hand paraesthesias in the past with no improvement  Denies any trauma, no hx of diabetes but mother does have diabetes  Patient reports increased urination recently as well  No discoloration of toes/fingers  Takes multivitamins  No steroid use, does drink energy drinks 2-3 a week  Denies GAUTAM   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  Review of Systems   Constitutional: Negative for chills and fever  HENT: Negative for ear pain and sore throat  Eyes: Negative for pain and visual disturbance  Respiratory: Negative for cough and shortness of breath  Cardiovascular: Negative for chest pain and palpitations  Gastrointestinal: Negative for abdominal pain and vomiting  Endocrine: Positive for polyuria  Genitourinary: Negative for dysuria and hematuria  Musculoskeletal: Negative for arthralgias and back pain  Skin: Negative for color change and rash  Neurological: Positive for numbness (intermittently, hands and feet)  Negative for seizures, syncope and weakness  All other systems reviewed and are negative  Current Outpatient Medications:   •  traZODone (DESYREL) 50 mg tablet, Take 1 tablet (50 mg total) by mouth daily at bedtime, Disp: 90 tablet, Rfl: 3  History reviewed  No pertinent past medical history    Past Surgical History:   Procedure Laterality Date   • APPENDECTOMY     • MOUTH SURGERY       Social History     Socioeconomic History   • Marital status: Single     Spouse name: Not on file   • Number of children: Not on file   • Years of education: Not on file   • Highest education level: Not on file   Occupational History   • Not on file   Tobacco Use   • Smoking status: Never Smoker   • Smokeless tobacco: Never Used   Vaping Use   • Vaping Use: Never used   Substance and Sexual Activity   • Alcohol use: Not Currently     Comment: occ   • Drug use: No   • Sexual activity: Yes     Partners: Female   Other Topics Concern   • Not on file   Social History Narrative   • Not on file     Social Determinants of Health     Financial Resource Strain: Low Risk    • Difficulty of Paying Living Expenses: Not hard at all   Food Insecurity: No Food Insecurity   • Worried About [facility] in the Last Year: Never true   • Ran Out of Food in the Last Year: Never true   Transportation Needs: No Transportation Needs   • Lack of Transportation (Medical): No   • Lack of Transportation (Non-Medical): No   Physical Activity: Not on file   Stress: Not on file   Social Connections: Not on file   Intimate Partner Violence: Not on file   Housing Stability: Low Risk    • Unable to Pay for Housing in the Last Year: No   • Number of Places Lived in the Last Year: 1   • Unstable Housing in the Last Year: No     Family History   Problem Relation Age of Onset   • Diabetes Mother    • Hypertension Father    • No Known Problems Brother      No Known Allergies    Objective     Vitals:    11/07/22 1135   BP: 131/85   BP Location: Left arm   Patient Position: Sitting   Cuff Size: Large   Pulse: 77   Temp: 97 8 °F (36 6 °C)   TempSrc: Temporal   Weight: 84 4 kg (186 lb)   Height: 5' 7" (1 702 m)       Physical exam:     GENERAL: NAD  HEENT:  NC/AT, PERRL, EOMI, no scleral icterus  CARDIAC:  RRR, +S1/S2, no S3/S4 heard, no m/g/r  PULMONARY:  CTA B/L, no wheezing/rales/rhonci, non-labored breathing  ABDOMEN:  Soft, NT/ND,no rebound/guarding/rigidity  Extremities:  No edema, cyanosis, or clubbing  2+ dp and pt pulses bilaterally  Warm, no rash or discoloration present  NEUROLOGIC: Strength, sensation intact, 1+ right achilles reflex, 2+ left achilles reflex  Other reflexes 2+ and symmetric   SKIN:  No rashes or erythema noted on exposed skin  Psych: Normal affect      Yue Lamas MD   PGY-2 Internal Medicine  [place]    ==  PLEASE NOTE:  This encounter was completed utilizing the [employer] Direct Speech Voice Recognition Software  Grammatical errors, random word insertions, pronoun errors and incomplete sentences are occasional consequences of the system due to software limitations, ambient noise and hardware issues  These may be missed by proof reading prior to affixing electronic signature   Any questions or concerns about the content, text or information contained within the body of this dictation should be directly addressed to the physician for clarification  Please do not hesitate to call me directly if you have any any questions or concerns

## 2022-11-10 ENCOUNTER — TELEPHONE (OUTPATIENT)
Dept: INTERNAL MEDICINE CLINIC | Facility: CLINIC | Age: 47
End: 2022-11-10

## 2022-11-10 LAB — VIT B1 BLD-SCNC: 104.1 NMOL/L (ref 66.5–200)

## 2022-11-13 LAB — VIT B6 SERPL-MCNC: 47.4 UG/L (ref 3.4–65.2)

## 2022-11-15 LAB
A-TOCOPHEROL VIT E SERPL-MCNC: 11.2 MG/L (ref 7–25.1)
GAMMA TOCOPHEROL SERPL-MCNC: 1.6 MG/L (ref 0.5–5.5)

## 2022-11-21 ENCOUNTER — CONSULT (OUTPATIENT)
Dept: PAIN MEDICINE | Facility: CLINIC | Age: 47
End: 2022-11-21

## 2022-11-21 VITALS
HEIGHT: 67 IN | WEIGHT: 189 LBS | HEART RATE: 72 BPM | SYSTOLIC BLOOD PRESSURE: 93 MMHG | DIASTOLIC BLOOD PRESSURE: 58 MMHG | BODY MASS INDEX: 29.66 KG/M2

## 2022-11-21 DIAGNOSIS — M54.16 LUMBAR RADICULOPATHY: Primary | ICD-10-CM

## 2022-11-21 DIAGNOSIS — M54.40 LOW BACK PAIN WITH SCIATICA, SCIATICA LATERALITY UNSPECIFIED, UNSPECIFIED BACK PAIN LATERALITY, UNSPECIFIED CHRONICITY: ICD-10-CM

## 2022-11-21 RX ORDER — METHYLPREDNISOLONE 4 MG/1
TABLET ORAL
Qty: 1 EACH | Refills: 0 | Status: SHIPPED | OUTPATIENT
Start: 2022-11-21

## 2022-11-21 NOTE — PROGRESS NOTES
Assessment  1  Lumbar radiculopathy    2  Low back pain with sciatica, sciatica laterality unspecified, unspecified back pain laterality, unspecified chronicity        Plan  75-year-old male, self-referred, presenting for initial consultation regarding lumbosacral back pain that radiates into the anterior aspect of bilateral lower extremities to the feet and great toe with associated paresthesias  He denies any significant weakness of the legs  Pain began in 2014 when the patient was dead lifting while training for a competition when he felt a pop in his back and his symptoms precipitated from there  CT of the thoracic and lumbar spine from December 2020 shows mild degenerative changes throughout the thoracic and lumbar spine  No fractures  Of note, I only have the radiologist's report, I do not have the actual images to review  The patient has not done any recent formal physical therapy or chiropractic treatment  He manages his pain with medical cannabis  This does provide some relief  Tylenol and NSAIDs have not provided any relief in the past     Patient's symptoms are consistent with lumbar radiculopathy predominantly in the L4-5 distribution  Fortunately reflexes and strength are preserved in the lower extremities  Positive straight leg raise on the left  1  I will prescribe physical therapy as I feel the patient may benefit from Urszula Fallen based exercises  2  I will prescribe a Medrol Dosepak for pain relief  3  Medical cannabis per certified prescriber  4  I will follow up the patient in 6 weeks and if he does not respond to conservative treatment will order an MRI of the lumbar spine without contrast       My impressions and treatment recommendations were discussed in detail with the patient who verbalized understanding and had no further questions  Discharge instructions were provided  I personally saw and examined the patient and I agree with the above discussed plan of care      No orders of the defined types were placed in this encounter  No orders of the defined types were placed in this encounter  History of Present Illness    Loren Teixeira is a 50 y o  male , self-referred, presenting for initial consultation regarding lumbosacral back pain that radiates into the anterior aspect of bilateral lower extremities to the feet and great toe with associated paresthesias  He denies any significant weakness of the legs  Pain began in 2014 when the patient was dead lifting while training for a competition when he felt a pop in his back and his symptoms precipitated from there  He does have rare and intermittent bladder leakage  He denies any bowel incontinence or saddle anesthesia  CT of the thoracic and lumbar spine from December 2020 shows mild degenerative changes throughout the thoracic and lumbar spine  No fractures  Of note, I only have the radiologist's report, I do not have the actual images to review  The patient has not done any recent formal physical therapy or chiropractic treatment  He manages his pain with medical cannabis  This does provide some relief  Tylenol and NSAIDs have not provided any relief in the past     The patient rates his pain a 9/10 on the pain is constant  The pain is not follow any particular pattern throughout the day  The pain is described as pressure-like, throbbing, dull, and aching  The pain is increased with sitting and decreased with lying down  Other than as stated above, the patient denies any interval changes in medications, medical condition, mental condition, symptoms, or allergies since the last office visit  I have personally reviewed and/or updated the patient's past medical history, past surgical history, family history, social history, current medications, allergies, and vital signs today  Review of Systems   Constitutional: Negative for fever and unexpected weight change  HENT: Negative for trouble swallowing  Eyes: Negative for visual disturbance  Respiratory: Negative for shortness of breath and wheezing  Cardiovascular: Negative for chest pain and palpitations  Gastrointestinal: Negative for constipation, diarrhea, nausea and vomiting  Endocrine: Negative for cold intolerance, heat intolerance and polydipsia  Genitourinary: Negative for difficulty urinating and frequency  Musculoskeletal: Negative for arthralgias, gait problem, joint swelling and myalgias  Skin: Negative for rash  Neurological: Negative for dizziness, seizures, syncope, weakness and headaches  Hematological: Does not bruise/bleed easily  Psychiatric/Behavioral: Negative for dysphoric mood  All other systems reviewed and are negative  Patient Active Problem List   Diagnosis   • Rhinitis   • Crushing injury of chest   • 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   • Chronic left shoulder pain   • Urinary incontinence   • Internal derangement of shoulder, left   • Tear of left supraspinatus tendon       History reviewed  No pertinent past medical history  Past Surgical History:   Procedure Laterality Date   • APPENDECTOMY     • MOUTH SURGERY         Family History   Problem Relation Age of Onset   • Diabetes Mother    • Hypertension Father    • No Known Problems Brother        Social History     Occupational History   • Not on file   Tobacco Use   • Smoking status: Never   • Smokeless tobacco: Never   Vaping Use   • Vaping Use: Never used   Substance and Sexual Activity   • Alcohol use: Not Currently     Comment: occ   • Drug use: No   • Sexual activity: Yes     Partners: Female       Current Outpatient Medications on File Prior to Visit   Medication Sig   • traZODone (DESYREL) 50 mg tablet Take 1 tablet (50 mg total) by mouth daily at bedtime     No current facility-administered medications on file prior to visit         No Known Allergies    Physical Exam    BP 93/58   Pulse 72   Ht 5' 7" (1 702 m)   Wt 85 7 kg (189 lb)   BMI 29 60 kg/m²     Constitutional: normal, well developed, well nourished, alert, in no distress and non-toxic and no overt pain behavior  Eyes: anicteric  HEENT: grossly intact  Neck: supple, symmetric, trachea midline and no masses   Pulmonary:even and unlabored  Cardiovascular:No edema or pitting edema present  Skin:Normal without rashes or lesions and well hydrated  Psychiatric:Mood and affect appropriate  Neurologic:Cranial Nerves II-XII grossly intact  Musculoskeletal:normal gait  Bilateral lumbar paraspinals nontender to palpation  Bilateral SI joints nontender to palpation  Bilateral trochanteric flares nontender to palpation  Bilateral patellar and Achilles reflexes were 2/4 and symmetrical   No clonus was noted bilaterally  Bilateral lower extremity strength was 5/5 in all muscle groups  Sensation intact to light touch in L3 thru S1 dermatomes bilaterally  Negative straight leg raise bilaterally  Negative Cody's and Gaenslen's test bilaterally  Imaging  L-spine -- Computed Tomography     Impression    IMPRESSION:     No acute fracture thoracic or lumbar spine  Please see CT scan of the chest, abdomen and pelvis for information regarding   other bony and visceral findings  Workstation:[id number]  Narrative    History: Trauma  Axial images of the thoracic and lumbar spine were extracted from a chest,   abdomen and pelvis CT data set  Sagittal and coronal reformats were performed  Examination was performed using bone algorithm which limits interpretation of   the soft tissues  Findings: There is a kyphotic curvature to the thoracic spine  There is a lordotic curve   of the lumbar spine  There are mild degenerative changes throughout the thoracic   or lumbar spine without acute fracture  Procedure Note    Rosa Lee MD - 12/09/2020   Formatting of this note might be different from the original    History: Trauma       Axial images of the thoracic and lumbar spine were extracted from a chest,   abdomen and pelvis CT data set  Sagittal and coronal reformats were performed  Examination was performed using bone algorithm which limits interpretation of   the soft tissues  Findings: There is a kyphotic curvature to the thoracic spine  There is a lordotic curve   of the lumbar spine  There are mild degenerative changes throughout the thoracic   or lumbar spine without acute fracture  IMPRESSION:   IMPRESSION:     No acute fracture thoracic or lumbar spine  Please see CT scan of the chest, abdomen and pelvis for information regarding   other bony and visceral findings

## 2022-11-29 ENCOUNTER — EVALUATION (OUTPATIENT)
Dept: PHYSICAL THERAPY | Facility: OTHER | Age: 47
End: 2022-11-29

## 2022-11-29 DIAGNOSIS — M54.16 LUMBAR RADICULOPATHY: ICD-10-CM

## 2022-11-29 NOTE — PROGRESS NOTES
PT Evaluation     Today's date: 2022  Patient name: Stephanie Sanchez  : 1975  MRN: [id number]  Referring provider: Xin Lopez DO  Dx:   Encounter Diagnosis     ICD-10-CM    1  Lumbar radiculopathy  M54 16 Ambulatory referral to Physical Therapy                     Assessment  Assessment details: Pt is a 51 yo male power  who injured his back in  while dead lifting  He has had intermittent pain since that time  He reports LBP and leg pain and paresthesias occur with sitting in most chairs  Using a cushion to sit on and lumbar support improves sitting tolerance  He presents with limited lumbar ROM, hamstring tightness and (+) dural tension tests  History indicates a direction preference for extension but due to elbow injury he was unable to tolerate end range extension in prone and he has difficulty getting to end range in standing  He would benefit from therapeutic exercises to improve flexibility and centralize and abolish pain  He currently does not feel that he needs physical therapy due to the amount of exercise he does however has agreed to try it to receive an MRI     Impairments: abnormal or restricted ROM, activity intolerance, impaired physical strength, lacks appropriate home exercise program and poor body mechanics    Symptom irritability: lowBarriers to therapy: Pt's attitude toward physical therapy  Understanding of Dx/Px/POC: good   Prognosis: good    Goals  STG: in 4 weeks  Pt is performing HEP consistently  Pt sits with lumbar support consistently  Pt is able to perform lumbar extension properly  LTG: by D/C  Pt is able to sit in any chair for 30 min without pain  Pt independent in HEP  Pt incorporates stretching of hamstrings into fitness program      Plan  Patient would benefit from: skilled physical therapy  Referral necessary: No  Planned therapy interventions: joint mobilization, manual therapy, patient education, stretching, therapeutic exercise and home exercise program  Frequency: 1x week  Duration in weeks: 10  Treatment plan discussed with: patient        Subjective Evaluation    History of Present Illness  Mechanism of injury: Aug, 8 2014  Pt was dead lifting and felt his spine pop  HNP compression of nerve root  Continues to do power lifting   Pain  Current pain ratin  At best pain rating: 3  At worst pain rating: 10  Location: Pain across the low back left greater than right  Patient Goals  Patient goal: to get an MRI        Objective     Concurrent Complaints  Positive for bladder dysfunction  Negative for night pain, disturbed sleep, bowel dysfunction and saddle (S4) numbness    Active Range of Motion     Lumbar   Flexion:  Restriction level: moderate  Extension:  Restriction level: maximal  Left lateral flexion:  Restriction level: moderate  Right lateral flexion:  Restriction level: minimal    Joint Play     Hypomobile: T10, T11, T12, L1, L2, L3, L4, L5 and S1     Pain: T11, T12, L1, L2, L3, L4 and L5   Mechanical Assessment    Cervical      Thoracic      Lumbar    Standing flexion: repeated movements   Pain location:no change  Lying flexion: repeated movements  Pain location: no change  Standing extension: repeated movements  Pain location: no change  Lying extension: repeated movements  Pain location: no change  limited by elbow pain    Strength/Myotome Testing     Left Hip   Planes of Motion   Flexion: 5  Extension: 5  Abduction: 5    Right Hip   Planes of Motion   Flexion: 5  Extension: 5  Abduction: 5    Left Knee   Flexion: 5  Extension: 5    Right Knee   Flexion: 5  Extension: 5    Left Ankle/Foot   Dorsiflexion: 4  Plantar flexion: 5  Great toe extension: 4    Right Ankle/Foot   Dorsiflexion: 4+  Plantar flexion: 5  Great toe extension: 5    Tests     Lumbar   Positive sacral thrust    Negative SIJ compression and sacroiliac distraction  Left   Positive passive SLR and slump test    Negative femoral stretch       Right   Positive slump test    Negative crossed SLR, femoral stretch and passive SLR  Precautions: none      Manuals 11/29            Hamstring stretch             PA glides L                                       Neuro Re-Ed                                                                                                        Ther Ex             Hamstring stretch seated 30"x4            Extension in standing 2x10            Extension at wall?                                                                               Ther Activity                                       Gait Training                                       Modalities

## 2022-12-06 ENCOUNTER — OFFICE VISIT (OUTPATIENT)
Dept: PHYSICAL THERAPY | Facility: OTHER | Age: 47
End: 2022-12-06

## 2022-12-06 DIAGNOSIS — M54.16 LUMBAR RADICULOPATHY: Primary | ICD-10-CM

## 2022-12-06 NOTE — PROGRESS NOTES
Daily Note     Today's date: 2022  Patient name: Tamiko Jesus  : 1975  MRN: [id number]  Referring provider: Honorio Vargas DO  Dx:   Encounter Diagnosis     ICD-10-CM    1  Lumbar radiculopathy  M54 16                      Subjective: "My back is okay, I notice some improvement with the extensions at home "      Objective: See treatment diary below      Assessment: Symptoms reproduction with Lumbar P-A, thoracic spine sig P-A damon  Unable to perform prone press ups due to elbow pain and did not achieve end range  Trialed open books for thoracic mobility, R side tighter compared to L but both sides limited  Pt with very tight hamstrings and tingling into the foot with icreased stretch, trial slump test nv  Continue with core stabilization and extension-biased exercises as tolerated  Plan: Continue per plan of care        Precautions: none      Manuals  12/6           Hamstring stretch  KA           PA glides L  KA T3-L5                                     Neuro Re-Ed             TrA  5"x30           pallof   BTB 5"x15           Bridge on ball  10"x10           Prone press ups  10x -- active paraspinals, difficulty pressing up with elbow           Open books  10"x5                                     Ther Ex             Hamstring stretch seated 30"x4 np HEP           Extension in standing 2x10 10x           Extension at wall?             bike  8'                                                               Ther Activity                                       Gait Training                                       Modalities

## 2022-12-13 ENCOUNTER — APPOINTMENT (OUTPATIENT)
Dept: PHYSICAL THERAPY | Facility: OTHER | Age: 47
End: 2022-12-13

## 2022-12-20 ENCOUNTER — OFFICE VISIT (OUTPATIENT)
Dept: PHYSICAL THERAPY | Facility: OTHER | Age: 47
End: 2022-12-20

## 2022-12-20 DIAGNOSIS — M54.16 LUMBAR RADICULOPATHY: Primary | ICD-10-CM

## 2022-12-20 NOTE — PROGRESS NOTES
Daily Note     Today's date: 2022  Patient name: Carina Garcia  : 1975  MRN: [id number]  Referring provider: Tiburcio Mendez DO  Dx:   Encounter Diagnosis     ICD-10-CM    1  Lumbar radiculopathy  M54 16                    Total treatment time 4088-8756, 1-on- [id number]  Subjective: "My back is feeling really good  I haven't really had leg symptoms "      Objective: See treatment diary below      Assessment: Tolerated treatment well, continues to require cueing for proper form for all exercises  Session continued to focus on core activation and stabilization  Gave for HEP for home  Will assess slump sliders nv and continue with core activation as tolerated, as well as extension-based exercises  Possible discharge nv  Plan: Continue per plan of care        Precautions: none  [id number]         Manuals           Hamstring stretch  KA KA          PA glides L  KA T3-L5 JUSTIN T3-L5                                    Neuro Re-Ed             TrA  5"x30 5"x10    10x ea:  B hip abd    B hip flex    B UE flex    B UE and LE alternate                pallof   BTB 5"x15 RTB 5"x15          Bridge on ball  10"x10 10"x10          Prone press ups  10x -- active paraspinals, difficulty pressing up with elbow           Open books  10"x5 10"x5                                    Ther Ex             Hamstring stretch seated 30"x4 np HEP           Extension in standing 2x10 10x 2x10          Extension at wall?             bike  8' 8'                                                              Ther Activity                                       Gait Training                                       Modalities

## 2022-12-27 ENCOUNTER — OFFICE VISIT (OUTPATIENT)
Dept: PHYSICAL THERAPY | Facility: OTHER | Age: 47
End: 2022-12-27

## 2022-12-27 DIAGNOSIS — M54.16 LUMBAR RADICULOPATHY: Primary | ICD-10-CM

## 2022-12-27 NOTE — PROGRESS NOTES
PT Discharge     Today's date: 2022  Patient name: Billy Zuñiga  : 1975  MRN: [id number]  Referring provider: Milton Sandoval DO  Dx:   Encounter Diagnosis     ICD-10-CM    1  Lumbar radiculopathy  M54 16                      Subjective: "My back feels good  I only have pain when I am sitting "      Objective: See treatment diary below      Assessment: Pt reported 70% improvement because his "pain was not that bad prior," only has pain when sitting  All other goals met  Discharged to HEP today                          Plan: Discharged to HEP     Precautions: none  [id number]         Manuals          Hamstring stretch  KA KA KA         PA gliamira L  KA T3-L5 KA T3-L5                                    Neuro Re-Ed             TrA  5"x30 5"x10    10x ea:  B hip abd    B hip flex    B UE flex    B UE and LE alternate       np HEP         pallof   BTB 5"x15 RTB 5"x15 RTB 5"x15    Rot RTB 15x    STS RTB 10x             Bridge on ball  10"x10 10"x10 10"x10         Prone press ups  10x -- active paraspinals, difficulty pressing up with elbow           Open books  10"x5 10"x5          Thread the needle                          Ther Ex             Hamstring stretch seated 30"x4 np HEP           Extension in standing 2x10 10x 2x10          Extension at wall?             bike  8' 8' 8'         1/2 kneel with [facility] press    trialed                                                Ther Activity                                       Gait Training                                       Modalities

## 2022-12-29 ENCOUNTER — CONSULT (OUTPATIENT)
Dept: GASTROENTEROLOGY | Facility: CLINIC | Age: 47
End: 2022-12-29

## 2022-12-29 VITALS
WEIGHT: 183 LBS | DIASTOLIC BLOOD PRESSURE: 78 MMHG | SYSTOLIC BLOOD PRESSURE: 120 MMHG | BODY MASS INDEX: 28.72 KG/M2 | HEIGHT: 67 IN | TEMPERATURE: 97.1 F

## 2022-12-29 DIAGNOSIS — Z12.11 ENCOUNTER FOR SCREENING COLONOSCOPY: ICD-10-CM

## 2022-12-29 RX ORDER — POLYETHYLENE GLYCOL 3350 17 G/17G
POWDER, FOR SOLUTION ORAL
Qty: 238 G | Refills: 0 | Status: SHIPPED | OUTPATIENT
Start: 2022-12-29

## 2022-12-29 NOTE — PATIENT INSTRUCTIONS
Scheduled date of colonoscopy (as of today):  2/24/23  Physician performing colonoscopy: Dr Kin Jones  [place] [facility] [place] reviewed with patient: miralax/dulcolax  Instructions reviewed with patient by: Fartun Begum   Clearances:   n/a

## 2022-12-29 NOTE — PROGRESS NOTES
[facility] - Outpatient Consultation  Jaydon Lucero 50 y o  male MRN: [id number]  Encounter: [id number]    Assessment and Plan    1  Colorectal cancer screening  The patient has yet to undergo colorectal cancer screening  He denies any family history of colon cancer and he has no alarm symptoms  His only GI complaint is a sensation that after he is done having a bowel movement he goes a little bit more  -Discussed colonoscopy in detail including the risks of bleeding, infection, bowel perforation, and missed polyp  -MiraLAX/Dulcolax bowel prep instructions provided to the patient    Follow-up after colonoscopy as needed    ______________________________________________________________________    History of Present Illness  Jaydon Lucero is a 50 y o  male here for consultation of colon cancer screening  The patient has yet to undergo screening colonoscopy  He denies any family history of colon cancer  His only GI complaint is the sensation that after he has a bowel movement he is done but then has to have a further bowel movement  Otherwise no GI complaints or alarm symptoms  Review of Systems   Constitutional: Negative for activity change, appetite change, chills, fatigue, fever and unexpected weight change  Gastrointestinal: Negative for abdominal distention, abdominal pain, anal bleeding, blood in stool, constipation, diarrhea, nausea, rectal pain and vomiting  Past Medical History  History reviewed  No pertinent past medical history      Past Social history  Past Surgical History:   Procedure Laterality Date   • APPENDECTOMY     • MOUTH SURGERY       Social History     Socioeconomic History   • Marital status: Single     Spouse name: Not on file   • Number of children: Not on file   • Years of education: Not on file   • Highest education level: Not on file   Occupational History   • Not on file   Tobacco Use   • Smoking status: Never   • Smokeless tobacco: Never   Vaping Use   • Vaping Use: Never used   Substance and Sexual Activity   • Alcohol use: Not Currently     Comment: occ   • Drug use: No   • Sexual activity: Yes     Partners: Female   Other Topics Concern   • Not on file   Social History Narrative   • Not on file     Social Determinants of Health     Financial Resource Strain: Low Risk    • Difficulty of Paying Living Expenses: Not hard at all   Food Insecurity: No Food Insecurity   • Worried About [address] in the Last Year: Never true   • Ran Out of Food in the Last Year: Never true   Transportation Needs: No Transportation Needs   • Lack of Transportation (Medical): No   • Lack of Transportation (Non-Medical): No   Physical Activity: Not on file   Stress: Not on file   Social Connections: Not on file   Intimate Partner Violence: Not on file   Housing Stability: Low Risk    • Unable to Pay for Housing in the Last Year: No   • Number of Places Lived in the Last Year: 1   • Unstable Housing in the Last Year: No     Social History     Substance and Sexual Activity   Alcohol Use Not Currently    Comment: occ     Social History     Substance and Sexual Activity   Drug Use No     Social History     Tobacco Use   Smoking Status Never   Smokeless Tobacco Never       Past Family History  Family History   Problem Relation Age of Onset   • Diabetes Mother    • Hypertension Father    • No Known Problems Brother        Current Medications  Current Outpatient Medications   Medication Sig Dispense Refill   • traZODone (DESYREL) 50 mg tablet Take 1 tablet (50 mg total) by mouth daily at bedtime 90 tablet 3   • methylPREDNISolone 4 MG tablet therapy pack Use as directed on package (Patient not taking: Reported on 12/29/2022) 1 each 0     No current facility-administered medications for this visit         Allergies  No Known Allergies      The following portions of the patient's history were reviewed and updated as appropriate: allergies, current medications, past medical history, past social history, past surgical history and problem list       Vitals  Vitals:    12/29/22 1340   BP: 120/78   BP Location: Right arm   Patient Position: Sitting   Cuff Size: Adult   Temp: (!) 97 1 °F (36 2 °C)   TempSrc: Tympanic   Weight: 83 kg (183 lb)   Height: 5' 7" (1 702 m)         Physical Exam  Constitutional   General appearance: Patient is seated and in no acute distress, well appearing and well nourished  Head and Face   Head and face: Normal     Eyes   Conjunctiva and lids: No erythema, swelling or discharge  Anicteric  Ears, Nose, Mouth, and Throat   Hearing: Normal     Neck: Supple, trachea midline  Pulmonary   Respiratory effort: No increased work of breathing or signs of respiratory distress  Lungs: Clear to ascultation, no wheezes, rhonchi, or rales  Cardiovascular   Heart: Regular rate and rhythm, no murmurs gallops or rubs   Examination of extremities for edema and/or varicosities: Normal     Musculoskeletal   Gait and station: Normal     Skin   Skin and subcutaneous tissue: Warm, dry, and intact  No visible jaundice, lesions or rashes  Psychiatric   Judgment and insight: Normal  Recent and remote memory:  Normal  Mood and affect: Normal      Results  No visits with results within 1 Day(s) from this visit     Latest known visit with results is:   Appointment on 11/07/2022   Component Date Value   • Sodium 11/07/2022 137    • Potassium 11/07/2022 4 0    • Chloride 11/07/2022 106    • CO2 11/07/2022 26    • ANION GAP 11/07/2022 5    • BUN 11/07/2022 18    • Creatinine 11/07/2022 1 35 (H)    • Glucose, Fasting 11/07/2022 91    • Calcium 11/07/2022 9 5    • AST 11/07/2022 17    • ALT 11/07/2022 34    • Alkaline Phosphatase 11/07/2022 72    • Total Protein 11/07/2022 7 8    • Albumin 11/07/2022 3 8    • Total Bilirubin 11/07/2022 0 49    • eGFR 11/07/2022 61    • TSH 3RD GENERATON 11/07/2022 1 540    • Vitamin B1, Whole Blood 11/07/2022 104 1    • Hepatitis C Ab 11/07/2022 Non-reactive    • Vitamin B6 11/07/2022 47 4    • Vitamin E(Alpha Tocopher* 11/07/2022 11 2    • Vitamin E(Gamma Tocopher* 11/07/2022 1 6    • Hemoglobin A1C 11/07/2022 5 4    • EAG 11/07/2022 108    • Vitamin B-12 11/07/2022 854    • Folate 11/07/2022 14 2        Radiology Results  No results found  Orders  No orders of the defined types were placed in this encounter

## 2022-12-30 NOTE — PROGRESS NOTES
Assessment:  1  Lumbar radiculopathy        Plan:  1  I will order an MRI of the lumbar spine without contrast for further evaluation of the patient's symptoms  2  Patient will continue with his home exercise program as taught to him by physical therapy  3  Patient will follow-up pending results of MRI or sooner if needed    History of Present Illness: The patient is a 50 y o  male last seen on 11/21/22 who presents for a follow up office visit in regards to chronic low back pain that radiates into the anterior aspect of the bilateral lower extremities to the toes with associated paresthesias the patient denies bowel incontinence  He does have some intermittent bladder leakage she has completed 4 sessions of physical therapy from November 29, 2022 to December 27, 2022 where he was discharged  Does continue with his home exercise program since  CT of the thoracic and lumbar spine from December 2020 reveals mild degenerative changes throughout the thoracic and lumbar spine  He has tried Tylenol and NSAIDs in the past without much relief  He rates his pain a 4 out of 10 on the numeric pain rating scale  He constantly has pain which is described as burning, throbbing and shooting    I have personally reviewed and/or updated the patient's past medical history, past surgical history, family history, social history, current medications, allergies, and vital signs today  Review of Systems:    Review of Systems   Respiratory: Negative for shortness of breath  Cardiovascular: Negative for chest pain  Gastrointestinal: Negative for constipation, diarrhea, nausea and vomiting  Musculoskeletal: Negative for arthralgias, gait problem, joint swelling and myalgias  Skin: Negative for rash  Neurological: Positive for dizziness  Negative for seizures and weakness  All other systems reviewed and are negative  History reviewed  No pertinent past medical history      Past Surgical History: Procedure Laterality Date   • APPENDECTOMY     • MOUTH SURGERY         Family History   Problem Relation Age of Onset   • Diabetes Mother    • Hypertension Father    • No Known Problems Brother        Social History     Occupational History   • Not on file   Tobacco Use   • Smoking status: Never   • Smokeless tobacco: Never   Vaping Use   • Vaping Use: Never used   Substance and Sexual Activity   • Alcohol use: Not Currently     Comment: occ   • Drug use: No   • Sexual activity: Yes     Partners: Female         Current Outpatient Medications:   •  traZODone (DESYREL) 50 mg tablet, Take 1 tablet (50 mg total) by mouth daily at bedtime, Disp: 90 tablet, Rfl: 3  •  bisacodyl (DULCOLAX) 5 mg EC tablet, Take 1 tablet (5 mg total) by mouth once for 1 dose, Disp: 2 tablet, Rfl: 0  •  methylPREDNISolone 4 MG tablet therapy pack, Use as directed on package (Patient not taking: Reported on 12/29/2022), Disp: 1 each, Rfl: 0  •  polyethylene glycol (GLYCOLAX) 17 GM/SCOOP powder, Take as directed as per written office instructions (Patient not taking: Reported on 1/3/2023), Disp: 238 g, Rfl: 0    No Known Allergies    Physical Exam:    /70   Pulse 73   Ht 5' 7" (1 702 m)   Wt 83 kg (183 lb)   BMI 28 66 kg/m²     Constitutional:normal, well developed, well nourished, alert, in no distress and non-toxic and no overt pain behavior    Eyes:anicteric  HEENT:grossly intact  Neck:supple, symmetric, trachea midline and no masses   Pulmonary:even and unlabored  Cardiovascular:No edema or pitting edema present  Skin:Normal without rashes or lesions and well hydrated  Psychiatric:Mood and affect appropriate  Neurologic:Cranial Nerves II-XII grossly intact  Musculoskeletal:normal gait      Imaging  MRI lumbar spine wo contrast    (Results Pending)         Orders Placed This Encounter   Procedures   • MRI lumbar spine wo contrast

## 2023-01-03 ENCOUNTER — OFFICE VISIT (OUTPATIENT)
Dept: PAIN MEDICINE | Facility: CLINIC | Age: 48
End: 2023-01-03

## 2023-01-03 VITALS
BODY MASS INDEX: 28.72 KG/M2 | WEIGHT: 183 LBS | DIASTOLIC BLOOD PRESSURE: 70 MMHG | SYSTOLIC BLOOD PRESSURE: 107 MMHG | HEART RATE: 73 BPM | HEIGHT: 67 IN

## 2023-01-03 DIAGNOSIS — M54.16 LUMBAR RADICULOPATHY: Primary | ICD-10-CM

## 2023-01-18 ENCOUNTER — HOSPITAL ENCOUNTER (OUTPATIENT)
Dept: RADIOLOGY | Facility: HOSPITAL | Age: 48
Discharge: HOME | End: 2023-01-18

## 2023-01-18 DIAGNOSIS — M54.16 LUMBAR RADICULOPATHY: ICD-10-CM

## 2023-01-23 ENCOUNTER — TELEPHONE (OUTPATIENT)
Dept: PAIN MEDICINE | Facility: CLINIC | Age: 48
End: 2023-01-23

## 2023-01-23 NOTE — TELEPHONE ENCOUNTER
----- Message from Charli Lockett [address]  sent at 1/23/2023 12:52 PM EST -----  MRI of the lumbar spine reveals a small left disc protrusion at L3-4 without any foraminal or central stenosis, and a small left disc protrusion at L5-S1 with minimal left recess stenosis

## 2023-01-27 ENCOUNTER — OFFICE VISIT (OUTPATIENT)
Dept: URGENT CARE | Age: 48
End: 2023-01-27

## 2023-01-27 VITALS
SYSTOLIC BLOOD PRESSURE: 114 MMHG | TEMPERATURE: 98.1 F | HEART RATE: 102 BPM | RESPIRATION RATE: 18 BRPM | OXYGEN SATURATION: 98 % | DIASTOLIC BLOOD PRESSURE: 70 MMHG

## 2023-01-27 DIAGNOSIS — R05.1 ACUTE COUGH: ICD-10-CM

## 2023-01-27 DIAGNOSIS — J06.9 VIRAL URI: Primary | ICD-10-CM

## 2023-01-27 LAB
SARS-COV-2 AG UPPER RESP QL IA: NEGATIVE
VALID CONTROL: NORMAL

## 2023-01-27 NOTE — PATIENT INSTRUCTIONS
No signs of bacterial infection on exam today  Signs & symptoms consistent with viral illness along with an expected/anticipated immune system response to your recent COVID-19 booster  OTC medications & supportive care as directed  Most viral illnesses last about 7-10 days with days 3-5 being the worst in severity of symptoms  If symptoms persist past 10 days, follow up with PCP  If symptoms worsen, proceed to the ER  As with any respiratory illness, transmission precautions are strongly advised  Masking  Isolating  Hand washing  Frequent cleaning of common use surfaces  Symptomatic treatment as needed  If sore throat:  Warm saltwater gargles every 1-2 hours while awake  Tylenol (or ibuprofen if allowed) as needed for any sore throat, fever, body aches and pains  If sinus pain / pressure:  Nasal saline spray may be helpful  Use every 2-3 hours while awake  Breathing in steamy air from shower and blowing nose to clear maybe helpful and can be done throughout day for comfort  Cold or warm compresses to head for comfort  Decongestant (if not contraindicated) may be helpful  Tylenol or Ibuprofen (if not contraindicated) may be helpful  Expectorant to keep mucous thin may also be helpful  PLEASE NOTE:  Yellow or green mucous does not necessarily mean a bacterial infection  Nasal drainage, congestion and / or cough typically peak around 7-10 days and then slowly resolve over next few weeks  (unless COVID, Influenza or RSV which can last longer)  You may use over the counter cough / cold medication as directed  This provider likes Mucinex D 12 hour formula - 1/2 to 1 tablet twice a day with full glass of fluid for daytime symptom relief (DO NOT TAKE IF YOU HAVE HIGH BLOOD PRESSURE  May take Coricidin HP and / or use plain Mucinex  If cough:  Cough drops, throat lozenges as needed  Vaporizer by the bedside  Warm tea with honey    May use nighttime cough medicine to help with sleep if needed -  Robitussin DM, Delsym or the like  Cough suppressants may cause drowsiness so recommend home use only  Please note that having a cough is not necessarily a bad thing  It's often your body's protective mechanism to help keep airways clear  If headache:  Tylenol (or Ibuprofen if allowed)  Cold compresses to head for comfort as needed  Rest   Fluids  If earache:  Tylenol (or Ibuprofen if allowed)  Warm compresses over ear(s) for comfort as needed  OTC nasal spray such as Flonase may be helpful  Rest   Fluids  If having fever or chills:  Tylenol (or Ibuprofen if allowed)  Recommend no work or outside activities  Rest   Fluids  (If you have a fever, it  typically lasts  approximately 3-5 days (unless influenza or COVID  Fever may last longer)  If diarrhea:  Clear liquids  You may want to avoid any milk products, fried - greasy foods, and / or spicy foods  If you are passing bloody diarrhea, seek further medical care  As a rule, we do not recommend using anti-diarrhea aids for acute diarrhea conditions  If significant worsening of your symptoms (ie  profound weakness, chest pain, shortness of breath, worst headache of your life, persistent vomiting), proceed to ER or call 911 for further evaluation  Follow up with your PCP if not improving over next 5-7 days  Retesting may be warranted if negative Covid test and recommended by your PCP

## 2023-01-27 NOTE — PROGRESS NOTES
[facility]        NAME: Melina Vera is a 50 y o  male  : 1975    MRN: [id number]  DATE: 2023  TIME: 9:27 AM    Assessment and Plan   Viral URI [J06 9]  1  Viral URI        2  Acute cough  Poct Covid 19 Rapid Antigen Test            Patient Instructions     Rapid COVID negative  S&S consistent with viral URI & post-vaccine side effects/immune response  OTC meds & supportive care  Follow up with PCP in 2-3 days  Proceed to ER if symptoms worsen  Chief Complaint     Chief Complaint   Patient presents with   • Sore Throat     Patient relates cough, sore throat, chills  States had last covid booster yesterday  Started this AM         History of Present Illness     50 y o  M  Cough, chills & sore throat x 1-2 hours  States he received COVID booster yesterday  No OTC meds  Denies recent travel  No CP or SOB    Review of Systems   Review of Systems   Constitutional: Positive for chills  Negative for fatigue and fever  HENT: Positive for sore throat  Negative for congestion, ear discharge, ear pain, facial swelling, rhinorrhea, sinus pressure, sinus pain and trouble swallowing  Eyes: Negative for photophobia, pain and visual disturbance  Respiratory: Positive for cough  Negative for chest tightness, shortness of breath and wheezing  Cardiovascular: Negative for chest pain, palpitations and leg swelling  Gastrointestinal: Negative for abdominal pain, diarrhea, nausea and vomiting  Genitourinary: Negative for dysuria, flank pain and hematuria  Musculoskeletal: Negative for arthralgias, back pain, myalgias and neck pain  Skin: Negative for pallor and wound  Neurological: Negative for dizziness, syncope, weakness, numbness and headaches  Psychiatric/Behavioral: Negative for confusion and sleep disturbance  All other systems reviewed and are negative          Current Medications       Current Outpatient Medications:   •  traZODone (DESYREL) 50 mg tablet, Take 1 tablet (50 mg total) by mouth daily at bedtime, Disp: 90 tablet, Rfl: 3  •  bisacodyl (DULCOLAX) 5 mg EC tablet, Take 1 tablet (5 mg total) by mouth once for 1 dose, Disp: 2 tablet, Rfl: 0  •  methylPREDNISolone 4 MG tablet therapy pack, Use as directed on package (Patient not taking: Reported on 12/29/2022), Disp: 1 each, Rfl: 0  •  polyethylene glycol (GLYCOLAX) 17 GM/SCOOP powder, Take as directed as per written office instructions (Patient not taking: Reported on 1/3/2023), Disp: 238 g, Rfl: 0    Current Allergies     Allergies as of 01/27/2023   • (No Known Allergies)            The following portions of the patient's history were reviewed and updated as appropriate: allergies, current medications, past family history, past medical history, past social history, past surgical history and problem list      History reviewed  No pertinent past medical history  Past Surgical History:   Procedure Laterality Date   • APPENDECTOMY     • MOUTH SURGERY         Family History   Problem Relation Age of Onset   • Diabetes Mother    • Hypertension Father    • No Known Problems Brother          Medications have been verified  Objective   /70   Pulse 102   Temp 98 1 °F (36 7 °C)   Resp 18   SpO2 98%   No LMP for male patient  Physical Exam     Physical Exam  Vitals reviewed  Constitutional:       General: He is not in acute distress  Appearance: He is normal weight  He is not ill-appearing or toxic-appearing  HENT:      Head: Normocephalic  Right Ear: Tympanic membrane normal  No middle ear effusion  Tympanic membrane is not erythematous or bulging  Left Ear: Tympanic membrane normal   No middle ear effusion  Tympanic membrane is not erythematous or bulging  Nose: Nose normal       Right Sinus: No maxillary sinus tenderness or frontal sinus tenderness  Left Sinus: No maxillary sinus tenderness or frontal sinus tenderness        Mouth/Throat:      Mouth: Mucous membranes are moist       Pharynx: Uvula midline  No oropharyngeal exudate, posterior oropharyngeal erythema or uvula swelling  Tonsils: No tonsillar exudate or tonsillar abscesses  Eyes:      Extraocular Movements: Extraocular movements intact  Conjunctiva/sclera: Conjunctivae normal       Pupils: Pupils are equal, round, and reactive to light  Cardiovascular:      Rate and Rhythm: Normal rate and regular rhythm  Pulses: Normal pulses  Heart sounds: Normal heart sounds  Pulmonary:      Effort: Pulmonary effort is normal  No tachypnea or respiratory distress  Breath sounds: Normal breath sounds and air entry  No decreased breath sounds, wheezing, rhonchi or rales  Comments:   CTAB  No cough on exam  Abdominal:      General: Bowel sounds are normal       Palpations: Abdomen is soft  Tenderness: There is no abdominal tenderness  Musculoskeletal:         General: Normal range of motion  Cervical back: Normal range of motion and neck supple  Lymphadenopathy:      Cervical: No cervical adenopathy  Skin:     General: Skin is warm and dry  Capillary Refill: Capillary refill takes less than 2 seconds  Neurological:      General: No focal deficit present  Mental Status: He is alert  Cranial Nerves: No cranial nerve deficit  Sensory: Sensation is intact  Motor: Motor function is intact  Coordination: Coordination is intact  Deep Tendon Reflexes: Reflexes are normal and symmetric

## 2023-01-30 ENCOUNTER — TELEPHONE (OUTPATIENT)
Dept: FAMILY MEDICINE CLINIC | Facility: CLINIC | Age: 48
End: 2023-01-30

## 2023-01-30 NOTE — TELEPHONE ENCOUNTER
Patient called requesting to speak with a doctor in regards to his recent diagnosis of bronchitis  He states Nyquil is not working for him  Would like a call back from a provider  He says his mucus, cough, chest discomfort and sore throat is not inproving

## 2023-01-30 NOTE — TELEPHONE ENCOUNTER
Patient called to get a status on his first call that [facility] wrote  I reassured patient it has been sent to clinical team and then has to be sent to Provider on what he would advise patient on what treatment he would recommend

## 2023-01-31 NOTE — TELEPHONE ENCOUNTER
Patient called back asking for an update on some medication  Patient states he needs it to stop his cough  Patient asked if someone can please call him back

## 2023-01-31 NOTE — TELEPHONE ENCOUNTER
Please see original message as patient is requesting to speak to provider and you are his PCP and task able      Please review and advise

## 2023-02-01 NOTE — TELEPHONE ENCOUNTER
Patient called back to go over his symptoms that has not improved  Still has coughing Chest congestion Mucous Stuffy Nose  Patient has an Appt on 2/2/23  He agreed to the Appt and understands that would be best instead of requesting script to get rid of the symptoms without being seen my our Providers

## 2023-02-02 ENCOUNTER — OFFICE VISIT (OUTPATIENT)
Dept: INTERNAL MEDICINE CLINIC | Facility: CLINIC | Age: 48
End: 2023-02-02

## 2023-02-02 VITALS
OXYGEN SATURATION: 97 % | DIASTOLIC BLOOD PRESSURE: 86 MMHG | WEIGHT: 187 LBS | HEIGHT: 67 IN | HEART RATE: 79 BPM | TEMPERATURE: 98.2 F | SYSTOLIC BLOOD PRESSURE: 123 MMHG | BODY MASS INDEX: 29.35 KG/M2

## 2023-02-02 DIAGNOSIS — R09.81 NASAL CONGESTION: ICD-10-CM

## 2023-02-02 DIAGNOSIS — R05.1 ACUTE COUGH: ICD-10-CM

## 2023-02-02 DIAGNOSIS — B34.9 VIRAL INFECTION: Primary | ICD-10-CM

## 2023-02-02 RX ORDER — FLUTICASONE PROPIONATE 50 MCG
1 SPRAY, SUSPENSION (ML) NASAL DAILY
Qty: 9.9 ML | Refills: 0 | Status: SHIPPED | OUTPATIENT
Start: 2023-02-02

## 2023-02-02 RX ORDER — BENZONATATE 100 MG/1
100 CAPSULE ORAL 3 TIMES DAILY PRN
Qty: 20 CAPSULE | Refills: 0 | Status: SHIPPED | OUTPATIENT
Start: 2023-02-02

## 2023-02-02 NOTE — PROGRESS NOTES
Name: Mague Park      : 1975      MRN: [id number]  Encounter Provider: ZACH Ott  Encounter Date: 2023   Encounter department: [address]     1  Viral infection  Assessment & Plan:  Patient presents with about 1 week history of nasal congestion, productive cough, body aches, headaches and fatigue  On exam, patient is in no acute distress  He is afebrile, heart rate 79, oxygen saturation 97% on room air  Lungs are clear to auscultation, respirations are unlabored  No sinus tenderness on palpation  COVID/Flu test negative on 23 at [facility] THE [facility]  Symptoms consistent with viral illness--counseled on supportive care with rest, fluids, well balanced diet, use of OTC Mucinex/ Tylenol/ibuprofen as needed  Recommend saline nasal spray, Flonase daily and tessalon perles 100 mg three times a day as needed  Please contact the office with any new or worsening symptoms  Orders:  -     fluticasone (FLONASE) 50 mcg/act nasal spray; 1 spray into each nostril daily    2  Acute cough  -     benzonatate (TESSALON PERLES) 100 mg capsule; Take 1 capsule (100 mg total) by mouth 3 (three) times a day as needed for cough    3  Nasal congestion  -     fluticasone (FLONASE) 50 mcg/act nasal spray; 1 spray into each nostril daily  -     benzonatate (TESSALON PERLES) 100 mg capsule; Take 1 capsule (100 mg total) by mouth 3 (three) times a day as needed for cough         Subjective      Patient with no past medical history on file presents for acute visit today for evaluation of cough and nasal congestion  Onset: 23  Symptoms: head congestion, cough (productive with yellow-brown sputum), body aches, chills, fatigue, headache   He denies any fevers, SOB/difficulty breathing  He does report that he has been going to the gym and noticed he gets a bit SOB with exercise/exertion    Frequency:  Symptoms staying the same/worsening since onset Previous treatments/work ups: Dayquil, Nyquil, Mucinex   ~1/26/23: received COVID 19 booster--initially thought symptoms related to that  ~1/27/23:  Urgent Care--COVID test negative, dx'd with viral URI/immune response to vaccine  ~1/29/23: [facility] ER--cough, sore throat, episode of hemoptysis-->COVID/Flu test negative, CT for PE negative, EKG-NSR       Review of Systems   Constitutional: Positive for chills and fatigue  Negative for fever  HENT: Positive for congestion and sore throat  Negative for ear pain  Eyes: Negative for pain and visual disturbance  Respiratory: Positive for cough (productive )  Negative for shortness of breath  Cardiovascular: Negative for chest pain and palpitations  Gastrointestinal: Negative for abdominal pain, diarrhea, nausea and vomiting  Genitourinary: Negative for dysuria and hematuria  Musculoskeletal: Negative for arthralgias and back pain  Skin: Negative for color change and rash  Neurological: Positive for headaches  Negative for seizures and syncope  All other systems reviewed and are negative  Current Outpatient Medications on File Prior to Visit   Medication Sig   • traZODone (DESYREL) 50 mg tablet Take 1 tablet (50 mg total) by mouth daily at bedtime   • [DISCONTINUED] bisacodyl (DULCOLAX) 5 mg EC tablet Take 1 tablet (5 mg total) by mouth once for 1 dose   • [DISCONTINUED] methylPREDNISolone 4 MG tablet therapy pack Use as directed on package (Patient not taking: Reported on 12/29/2022)   • [DISCONTINUED] polyethylene glycol (GLYCOLAX) 17 GM/SCOOP powder Take as directed as per written office instructions (Patient not taking: Reported on 1/3/2023)       Objective     /86 (BP Location: Right arm, Patient Position: Sitting, Cuff Size: Large)   Pulse 79   Temp 98 2 °F (36 8 °C) (Temporal)   Ht 5' 7" (1 702 m)   Wt 84 8 kg (187 lb)   SpO2 97%   BMI 29 29 kg/m²     Physical Exam  Vitals and nursing note reviewed     Constitutional: General: He is not in acute distress  Appearance: Normal appearance  HENT:      Head: Normocephalic  Right Ear: Tympanic membrane and external ear normal       Left Ear: Tympanic membrane and external ear normal       Nose: Congestion present  Mouth/Throat:      Mouth: Mucous membranes are moist       Pharynx: Posterior oropharyngeal erythema present  Eyes:      Conjunctiva/sclera: Conjunctivae normal       Pupils: Pupils are equal, round, and reactive to light  Cardiovascular:      Rate and Rhythm: Normal rate and regular rhythm  Pulses: Normal pulses  Heart sounds: Normal heart sounds  Pulmonary:      Effort: Pulmonary effort is normal  No respiratory distress  Breath sounds: Normal breath sounds  No wheezing or rhonchi  Abdominal:      General: Bowel sounds are normal  There is no distension  Palpations: Abdomen is soft  Tenderness: There is no abdominal tenderness  Musculoskeletal:         General: Normal range of motion  Cervical back: Normal range of motion  Skin:     General: Skin is warm and dry  Capillary Refill: Capillary refill takes less than 2 seconds  Neurological:      General: No focal deficit present  Mental Status: He is alert and oriented to person, place, and time     Psychiatric:         Mood and Affect: Mood normal          Behavior: Behavior normal        ZACH Mistry

## 2023-02-02 NOTE — ASSESSMENT & PLAN NOTE
Patient presents with about 1 week history of nasal congestion, productive cough, body aches, headaches and fatigue  On exam, patient is in no acute distress  He is afebrile, heart rate 79, oxygen saturation 97% on room air  Lungs are clear to auscultation, respirations are unlabored  No sinus tenderness on palpation  COVID/Flu test negative on 1/29/23 at [facility]  Symptoms consistent with viral illness--counseled on supportive care with rest, fluids, well balanced diet, use of OTC Mucinex/ Tylenol/ibuprofen as needed  Recommend saline nasal spray, Flonase daily and tessalon perles 100 mg three times a day as needed  Please contact the office with any new or worsening symptoms

## 2023-02-24 ENCOUNTER — ANESTHESIA (OUTPATIENT)
Dept: GASTROENTEROLOGY | Facility: HOSPITAL | Age: 48
End: 2023-02-24

## 2023-02-24 ENCOUNTER — HOSPITAL ENCOUNTER (OUTPATIENT)
Dept: GASTROENTEROLOGY | Facility: HOSPITAL | Age: 48
Setting detail: OUTPATIENT SURGERY
End: 2023-02-24
Attending: INTERNAL MEDICINE

## 2023-02-24 ENCOUNTER — ANESTHESIA EVENT (OUTPATIENT)
Dept: GASTROENTEROLOGY | Facility: HOSPITAL | Age: 48
End: 2023-02-24

## 2023-02-24 VITALS
TEMPERATURE: 96.8 F | HEIGHT: 67 IN | OXYGEN SATURATION: 95 % | SYSTOLIC BLOOD PRESSURE: 100 MMHG | DIASTOLIC BLOOD PRESSURE: 68 MMHG | HEART RATE: 73 BPM | RESPIRATION RATE: 16 BRPM | WEIGHT: 182 LBS | BODY MASS INDEX: 28.56 KG/M2

## 2023-02-24 DIAGNOSIS — Z12.11 ENCOUNTER FOR SCREENING COLONOSCOPY: ICD-10-CM

## 2023-02-24 RX ORDER — LIDOCAINE HYDROCHLORIDE 10 MG/ML
INJECTION, SOLUTION EPIDURAL; INFILTRATION; INTRACAUDAL; PERINEURAL AS NEEDED
Status: DISCONTINUED | OUTPATIENT
Start: 2023-02-24 | End: 2023-02-24

## 2023-02-24 RX ORDER — SODIUM CHLORIDE 9 MG/ML
INJECTION, SOLUTION INTRAVENOUS CONTINUOUS PRN
Status: DISCONTINUED | OUTPATIENT
Start: 2023-02-24 | End: 2023-02-24

## 2023-02-24 RX ORDER — PROPOFOL 10 MG/ML
INJECTION, EMULSION INTRAVENOUS AS NEEDED
Status: DISCONTINUED | OUTPATIENT
Start: 2023-02-24 | End: 2023-02-24

## 2023-02-24 RX ORDER — PROPOFOL 10 MG/ML
INJECTION, EMULSION INTRAVENOUS CONTINUOUS PRN
Status: DISCONTINUED | OUTPATIENT
Start: 2023-02-24 | End: 2023-02-24

## 2023-02-24 RX ADMIN — PROPOFOL 150 MG: 10 INJECTION, EMULSION INTRAVENOUS at 11:15

## 2023-02-24 RX ADMIN — PROPOFOL 160 MCG/KG/MIN: 10 INJECTION, EMULSION INTRAVENOUS at 11:15

## 2023-02-24 RX ADMIN — LIDOCAINE HYDROCHLORIDE 50 MG: 10 INJECTION, SOLUTION EPIDURAL; INFILTRATION; INTRACAUDAL; PERINEURAL at 11:15

## 2023-02-24 RX ADMIN — SODIUM CHLORIDE: 0.9 INJECTION, SOLUTION INTRAVENOUS at 11:11

## 2023-02-24 RX ADMIN — PROPOFOL 50 MG: 10 INJECTION, EMULSION INTRAVENOUS at 11:18

## 2023-02-24 NOTE — ANESTHESIA POSTPROCEDURE EVALUATION
Post-Op Assessment Note    CV Status:  Stable    Pain management: adequate     Mental Status:  Alert and awake   Hydration Status:  Euvolemic   PONV Controlled:  Controlled   Airway Patency:  Patent   Two or more mitigation strategies used for obstructive sleep apnea   Post Op Vitals Reviewed: Yes      Staff: CRNA         There were no known notable events for this encounter      BP   99/56   Temp  97   Pulse  67   Resp   12   SpO2   94

## 2023-02-24 NOTE — H&P
History and Physical -  [facility]  Gail Murray 50 y o  male MRN: [id number]                  HPI: Gail Murray is a 50y o  year old male who presents for colonoscopy for colorectal cancer screening  REVIEW OF SYSTEMS: Per the HPI, and otherwise unremarkable  Historical Information   History reviewed  No pertinent past medical history  Past Surgical History:   Procedure Laterality Date   • APPENDECTOMY     • MOUTH SURGERY       Social History   Social History     Substance and Sexual Activity   Alcohol Use Not Currently    Comment: occ     Social History     Substance and Sexual Activity   Drug Use No     Social History     Tobacco Use   Smoking Status Never   Smokeless Tobacco Never     Family History   Problem Relation Age of Onset   • Diabetes Mother    • Hypertension Father    • No Known Problems Brother        Meds/Allergies       Current Outpatient Medications:   •  benzonatate (TESSALON PERLES) 100 mg capsule  •  fluticasone (FLONASE) 50 mcg/act nasal spray  •  traZODone (DESYREL) 50 mg tablet    No Known Allergies    Objective     /68   Pulse 76   Temp (!) 96 2 °F (35 7 °C) (Tympanic)   Resp 16   Ht 5' 7" (1 702 m)   Wt 82 6 kg (182 lb)   SpO2 95%   BMI 28 51 kg/m²       PHYSICAL EXAM    Gen: NAD  Head: NCAT  CV: RRR  CHEST: Clear  ABD: soft, NT/ND  EXT: no edema      ASSESSMENT/PLAN:  Gail Murray is a 50y o  year old male who presents for colonoscopy for colorectal cancer screening  The patient is stable and optimized for the procedure, we reviewed risk and benefits  Risk include but not limited to infection, bleeding, perforation and missing a lesion

## 2023-02-24 NOTE — ANESTHESIA PREPROCEDURE EVALUATION
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   (+) Low back pain with sciatica      NEURO/PSYCH   (+) Chronic left shoulder pain        Physical Exam    Airway    Mallampati score: III    Neck ROM: full     Dental   No notable dental hx     Cardiovascular      Pulmonary      Other Findings        Anesthesia Plan  ASA Score- 2     Anesthesia Type- IV sedation with anesthesia with ASA Monitors  Additional Monitors:   Airway Plan:           Plan Factors-Exercise tolerance (METS): >4 METS  Chart reviewed  Patient summary reviewed  Patient is not a current smoker  Obstructive sleep apnea risk education given perioperatively  Induction- intravenous  Postoperative Plan-     Informed Consent- Anesthetic plan and risks discussed with patient  I personally reviewed this patient with the CRNA  Discussed and agreed on the Anesthesia Plan with the CRNA  Vitaliy [place]

## 2023-02-27 ENCOUNTER — OFFICE VISIT (OUTPATIENT)
Dept: INTERNAL MEDICINE CLINIC | Facility: CLINIC | Age: 48
End: 2023-02-27

## 2023-02-27 VITALS
WEIGHT: 185.8 LBS | BODY MASS INDEX: 30.96 KG/M2 | OXYGEN SATURATION: 99 % | SYSTOLIC BLOOD PRESSURE: 124 MMHG | HEIGHT: 65 IN | DIASTOLIC BLOOD PRESSURE: 78 MMHG | TEMPERATURE: 98.4 F | HEART RATE: 73 BPM

## 2023-02-27 DIAGNOSIS — L23.1 ALLERGIC CONTACT DERMATITIS DUE TO ADHESIVES: Primary | ICD-10-CM

## 2023-02-27 RX ORDER — DIAPER,BRIEF,INFANT-TODD,DISP
EACH MISCELLANEOUS 4 TIMES DAILY PRN
Qty: 30 G | Refills: 0 | Status: SHIPPED | OUTPATIENT
Start: 2023-02-27

## 2023-02-27 NOTE — PATIENT INSTRUCTIONS
Mr Hines Matters,    Today you came to clinic with complaints of rash and itching on both of your wrists  It seems that the itching has improved with benadryl, but now you are still having rash/redness despite not itching the area  This does appear like a contact dermatitis, possibly from your wrist bands  Let's see how you do after covering the area tonight with your wrist band  But otherwise, we are prescribing you Hydrocortisone cream 1% to be used four times daily as needed for rash/itching  You can continue Benadryl at night, but stop taking this once you notice the itching or rash start to get better  It was a pleasure taking care of you today, I hope you feel better soon  Please reach out to the office if you notice the rash/itching getting worse or not improving within a week  - - - - - - - - - - -- - - - - - - - - - -- - - - - - - - - - -    Colt Javed MD  Internal Medicine Resident, PGY-1  [facility]  [phone]

## 2023-02-28 NOTE — PROGRESS NOTES
Name: Melina Vera      : 1975      MRN: [id number]  Encounter Provider: Deisy Martin MD  Encounter Date: 2023   Encounter department: [address] [place] [address]    Assessment & Plan     49 y/o man with no significant medical hx and NKA/NKDA who comes here for b/l itching + rash on wrists x 7 days  Itching has since improved with qhs Benadryl, however is still bothered by rash on both wrists  This is likely contact dermatitis given symptomatology, which is either 2/2 wrist bands worn during exercise or from detergent/soap used to wash wrist bands after exercise  Will start topical low potency steroid qid prn, and may d/c Benadryl qhs  He may follow up with clinic as scheduled for routine health exam / annual physical     1  Allergic contact dermatitis due to adhesives  - NKA, NKDA  - No recent changes in soaps, detergents, shampoos, foods, medications  - Has recently changed  of daily supplements, however has not changed supplements themselves  Started taking new brand of supplements about 2 weeks ago  - Rash not preceded by bug bite or skin exposure to plants/trees  No skin penetration at affected areas  - No Red flags present such as dysphagia, difficulty breathing, tachycardia/palpittaions, diaphoresis  - Skin on b/l wrists does appear excoriated, and rash could be from damage 2/2 consistent itching  Surrounding area of skin is also dry, rashes are non-blanching    - Likely result of irritation from wrist bands worn during lifting exercises  Bands are reportedly hypoallergenic per pt and w/o latex  - Has been washing bands more frequently over past week or so, may represent contact dermatitis 2/2 soap/detergent used to clean these accessories  - Less likely but still possibly owed to binders used in new brand of supplements   However would be abnormal to have such a focused and limited area of rash/itching from substance allergy  - To start covering wrists w/ cloth to be placed under wrist bands    Plan:    1) Start hydrocortisone 1 % cream; Apply topically 4 (four) times a day as needed for irritation or rash  2) Stop PO Benadryl qhs  3) Encouraged daily use of skin moisturizer to area  4) Encouraged taking daily log of creams/soaps/shampoos/detergents used should itch/rash not zeinab  5) To reach out to clinic in 1 week via telephone for update in clinical status       BMI Counseling: Body mass index is 30 92 kg/m²  The BMI is above normal  Nutrition recommendations include decreasing portion sizes, decreasing fast food intake, consuming healthier snacks and reducing intake of cholesterol  Exercise recommendations include moderate physical activity 150 minutes/week and strength training exercises  Rationale for BMI follow-up plan is due to patient being overweight or obese  Subjective      Mr Kalpana Olivo is a 49 y/o man with no signficant MHx and NKA/NKDA who comes here for "itchy spots on L wrist" x 1 week  He states that he first began noticing intesne pruritis over L wrist which was met with signficiant scratching/itching  This was then followed by "red spots" on the L wrist  He does endorse similar timing of sx's on R wrist but at lower severity  Because of unrelenting pruritis/itching he decided to start taking OTC Benadryl qhs as to not 'worsen rash' 3 days prior to presentation  He has since been without pruritis, however the rash has still reamined  He also noticed that the rash on his R wrist became "more visible" about 1-2 days prior to presentation  He denies any new soaps/detergents/shampoos over the past month, and similarly denies any new foods or medications added  He does say that he recently started taking a different brand of daily supplements, however did not change the supplements themselves  He denies any puncture of the R or L wrist, any recent bug bites, or exposure to trees/plants/leaves   He has been ill for the past month or so with an URI, and therefore it is difficult to note whether or not he has had any hay fever-like sx's  However there have been no feelings of throat closure/difficulty breathing, diaphoresis, tachycardia/palpitations, chest tightness, swelling of the wrists, drainage of fluid from rashes, nor wrist pain  He otherwise has no add'l complaints, and would like to get this rash dealt with soon as he is starting a new job on a [employer]  He fears that he could potentially have a contagious cutaneous lesion that would bar him from starting work  Rash  Associated symptoms include congestion  Pertinent negatives include no cough, diarrhea, fever, rhinorrhea, shortness of breath, sore throat or vomiting  Review of Systems   Constitutional: Negative for activity change, chills and fever  HENT: Positive for congestion  Negative for mouth sores, postnasal drip, rhinorrhea, sneezing, sore throat and trouble swallowing  Eyes: Negative for visual disturbance  Respiratory: Negative for apnea, cough, chest tightness and shortness of breath  Cardiovascular: Negative for chest pain and palpitations  Gastrointestinal: Negative for diarrhea, nausea and vomiting  Genitourinary: Negative for dysuria, flank pain, frequency and hematuria  Musculoskeletal: Negative for arthralgias, neck pain and neck stiffness  Skin: Positive for rash  Neurological: Negative for dizziness, syncope, weakness, light-headedness, numbness and headaches         Current Outpatient Medications on File Prior to Visit   Medication Sig   • traZODone (DESYREL) 50 mg tablet Take 1 tablet (50 mg total) by mouth daily at bedtime   • benzonatate (TESSALON PERLES) 100 mg capsule Take 1 capsule (100 mg total) by mouth 3 (three) times a day as needed for cough (Patient not taking: Reported on 2/27/2023)   • fluticasone (FLONASE) 50 mcg/act nasal spray 1 spray into each nostril daily (Patient not taking: Reported on 2/27/2023)       Objective /78 (BP Location: Left arm, Patient Position: Sitting, Cuff Size: Large)   Pulse 73   Temp 98 4 °F (36 9 °C) (Temporal)   Ht 5' 5" (1 651 m)   Wt 84 3 kg (185 lb 12 8 oz)   SpO2 99%   BMI 30 92 kg/m²     Physical Exam  Constitutional:       General: He is not in acute distress  Appearance: Normal appearance  He is not ill-appearing, toxic-appearing or diaphoretic  HENT:      Head: Normocephalic and atraumatic  Nose: No congestion or rhinorrhea  Mouth/Throat:      Mouth: Mucous membranes are moist       Pharynx: Oropharynx is clear  No oropharyngeal exudate or posterior oropharyngeal erythema  Eyes:      General: No scleral icterus  Conjunctiva/sclera: Conjunctivae normal    Neck:      Vascular: No carotid bruit  Cardiovascular:      Rate and Rhythm: Normal rate and regular rhythm  Pulses: Normal pulses  Heart sounds: No murmur heard  No friction rub  No gallop  Pulmonary:      Effort: Pulmonary effort is normal  No respiratory distress  Breath sounds: No wheezing or rhonchi  Abdominal:      General: Abdomen is flat  There is no distension  Palpations: Abdomen is soft  Tenderness: There is no abdominal tenderness  There is no guarding or rebound  Musculoskeletal:        Hands:       Cervical back: Neck supple  No tenderness  Right lower leg: No edema  Left lower leg: No edema  Lymphadenopathy:      Cervical: No cervical adenopathy  Skin:     General: Skin is warm and dry  Capillary Refill: Capillary refill takes less than 2 seconds  Findings: Rash present  Neurological:      Mental Status: He is alert         Peyton Galeana MD

## 2023-04-05 DIAGNOSIS — G47.9 SLEEP DISTURBANCE: ICD-10-CM

## 2023-04-05 NOTE — TELEPHONE ENCOUNTER
Requested Prescriptions     Pending Prescriptions Disp Refills   • traZODone (DESYREL) 50 mg tablet 90 tablet 3     Sig: Take 1 tablet (50 mg total) by mouth daily at bedtime

## 2023-04-06 RX ORDER — TRAZODONE HYDROCHLORIDE 50 MG/1
50 TABLET ORAL
Qty: 90 TABLET | Refills: 3 | Status: SHIPPED | OUTPATIENT
Start: 2023-04-06

## 2023-09-19 ENCOUNTER — APPOINTMENT (OUTPATIENT)
Dept: RADIOLOGY | Age: 48
End: 2023-09-19
Payer: MEDICARE

## 2023-09-19 ENCOUNTER — OFFICE VISIT (OUTPATIENT)
Dept: URGENT CARE | Age: 48
End: 2023-09-19
Payer: MEDICARE

## 2023-09-19 VITALS
OXYGEN SATURATION: 99 % | WEIGHT: 185 LBS | RESPIRATION RATE: 18 BRPM | TEMPERATURE: 97.5 F | BODY MASS INDEX: 30.82 KG/M2 | HEIGHT: 65 IN | HEART RATE: 64 BPM

## 2023-09-19 DIAGNOSIS — M25.531 RIGHT WRIST PAIN: ICD-10-CM

## 2023-09-19 DIAGNOSIS — S63.501A SPRAIN OF RIGHT WRIST, INITIAL ENCOUNTER: Primary | ICD-10-CM

## 2023-09-19 PROCEDURE — 99213 OFFICE O/P EST LOW 20 MIN: CPT | Performed by: PHYSICIAN ASSISTANT

## 2023-09-19 PROCEDURE — 73110 X-RAY EXAM OF WRIST: CPT

## 2023-09-19 PROCEDURE — 29125 APPL SHORT ARM SPLINT STATIC: CPT | Performed by: PHYSICIAN ASSISTANT

## 2023-09-19 RX ORDER — IBUPROFEN 600 MG/1
600 TABLET ORAL EVERY 6 HOURS PRN
Qty: 30 TABLET | Refills: 0 | Status: SHIPPED | OUTPATIENT
Start: 2023-09-19

## 2023-09-19 NOTE — PROGRESS NOTES
[facility]        NAME: Dorcas Miramontes is a 46 y.o. male  : 1975    MRN: [id number]  DATE: 2023  TIME: 9:15 AM    Pulse 64   Temp 97.5 °F (36.4 °C) (Tympanic)   Resp 18   Ht 5' 5" (1.651 m)   Wt 83.9 kg (185 lb)   SpO2 99%   BMI 30.79 kg/m²     Assessment and Plan   Sprain of right wrist, initial encounter [S63.501A]  1. Sprain of right wrist, initial encounter  XR wrist 3+ vw right    Splint application    Ambulatory referral to Orthopedic Surgery    ibuprofen (MOTRIN) 600 mg tablet            Patient Instructions       Follow up with PCP in 3-5 days. Proceed to  ER if symptoms worsen. Chief Complaint     Chief Complaint   Patient presents with   • Wrist Pain     Right wrist pain. Unknown injury or trauma. Patient states it is severe pain. Went to gym with no problem. Lifted briefcase 5 days ago and noticed numbing pain. History of Present Illness       Pt with right wrist pain , onset 4=5 days ago while lifting a bag. Review of Systems   Review of Systems   Constitutional: Negative. HENT: Negative. Eyes: Negative. Respiratory: Negative. Cardiovascular: Negative. Gastrointestinal: Negative. Endocrine: Negative. Genitourinary: Negative. Musculoskeletal: Negative. Skin: Negative. Allergic/Immunologic: Negative. Neurological: Negative. Hematological: Negative. Psychiatric/Behavioral: Negative. All other systems reviewed and are negative.         Current Medications       Current Outpatient Medications:   •  ibuprofen (MOTRIN) 600 mg tablet, Take 1 tablet (600 mg total) by mouth every 6 (six) hours as needed for mild pain, Disp: 30 tablet, Rfl: 0  •  traZODone (DESYREL) 50 mg tablet, Take 1 tablet (50 mg total) by mouth daily at bedtime, Disp: 90 tablet, Rfl: 3  •  benzonatate (TESSALON PERLES) 100 mg capsule, Take 1 capsule (100 mg total) by mouth 3 (three) times a day as needed for cough (Patient not taking: Reported on 2/27/2023), Disp: 20 capsule, Rfl: 0  •  fluticasone (FLONASE) 50 mcg/act nasal spray, 1 spray into each nostril daily (Patient not taking: Reported on 2/27/2023), Disp: 9.9 mL, Rfl: 0  •  hydrocortisone 1 % cream, Apply topically 4 (four) times a day as needed for irritation or rash (Patient not taking: Reported on 9/19/2023), Disp: 30 g, Rfl: 0    Current Allergies     Allergies as of 09/19/2023   • (No Known Allergies)            The following portions of the patient's history were reviewed and updated as appropriate: allergies, current medications, past family history, past medical history, past social history, past surgical history and problem list.     History reviewed. No pertinent past medical history. Past Surgical History:   Procedure Laterality Date   • APPENDECTOMY     • MOUTH SURGERY         Family History   Problem Relation Age of Onset   • Diabetes Mother    • Hypertension Father    • No Known Problems Brother          Medications have been verified. Objective   Pulse 64   Temp 97.5 °F (36.4 °C) (Tympanic)   Resp 18   Ht 5' 5" (1.651 m)   Wt 83.9 kg (185 lb)   SpO2 99%   BMI 30.79 kg/m²          Physical Exam     Physical Exam  Vitals and nursing note reviewed. Constitutional:       Appearance: Normal appearance. He is normal weight. Eyes:      Extraocular Movements: Extraocular movements intact. Cardiovascular:      Rate and Rhythm: Normal rate and regular rhythm. Pulses: Normal pulses. Heart sounds: Normal heart sounds. Pulmonary:      Effort: Pulmonary effort is normal.      Breath sounds: Normal breath sounds. Abdominal:      General: Abdomen is flat. Bowel sounds are normal.   Musculoskeletal:      Cervical back: Normal range of motion and neck supple. Comments: Right wrist tender  From  Dorsum tender to palp no erythema no swelling radial pulse strong no phalen no tinel sign distal neuro and vascular wnl    Skin:     General: Skin is warm.       Capillary Refill: Capillary refill takes less than 2 seconds. Neurological:      General: No focal deficit present. Mental Status: He is alert and oriented to person, place, and time. Splint application    Date/Time: 9/19/2023 8:30 AM    Performed by: Amandeep Kam PA-C  Authorized by: Amandeep Kam PA-C  [place] Protocol:  Procedure performed by:  Consent: Verbal consent obtained. Written consent not obtained. Patient identity confirmed: verbally with patient      Pre-procedure details:     Sensation:  Normal  Procedure details:     Laterality:  Right    Location:  Wrist    Wrist:  R wrist    Strapping: no  Cast type:  Short arm      Splint type:  Short arm splint, static (forearm to hand)    Supplies:  Waterproof  Post-procedure details:     Pain:  Improved    Sensation:  Normal    Patient tolerance of procedure:   Tolerated well, no immediate complications

## 2023-09-20 ENCOUNTER — TELEPHONE (OUTPATIENT)
Age: 48
End: 2023-09-20

## 2023-09-20 NOTE — TELEPHONE ENCOUNTER
Patient is being referred to a orthopedics. Please schedule accordingly.     [facility]   [phone]

## 2023-11-01 ENCOUNTER — TELEPHONE (OUTPATIENT)
Dept: INTERNAL MEDICINE CLINIC | Facility: CLINIC | Age: 48
End: 2023-11-01

## 2023-11-01 NOTE — TELEPHONE ENCOUNTER
traZODone (DESYREL) 50 mg tablet     Patient called to get the dosage changed from 50 mg tablet to 100 mg tablet due to the fact that it's not strong enough to sleep. Please check into this and call the patient to advise that the dosage was changed.        thanks

## 2023-11-08 DIAGNOSIS — G47.9 SLEEP DISTURBANCE: Primary | ICD-10-CM

## 2023-11-08 RX ORDER — TRAZODONE HYDROCHLORIDE 100 MG/1
100 TABLET ORAL
Qty: 90 TABLET | Refills: 3 | Status: SHIPPED | OUTPATIENT
Start: 2023-11-08

## 2024-01-29 DIAGNOSIS — G47.9 SLEEP DISTURBANCE: ICD-10-CM

## 2024-01-29 RX ORDER — TRAZODONE HYDROCHLORIDE 100 MG/1
100 TABLET ORAL
Qty: 90 TABLET | Refills: 3 | Status: SHIPPED | OUTPATIENT
Start: 2024-01-29

## 2024-04-23 DIAGNOSIS — G47.9 SLEEP DISTURBANCE: ICD-10-CM

## 2024-04-23 RX ORDER — TRAZODONE HYDROCHLORIDE 100 MG/1
100 TABLET ORAL
Qty: 90 TABLET | Refills: 3 | Status: SHIPPED | OUTPATIENT
Start: 2024-04-23

## 2024-04-23 NOTE — TELEPHONE ENCOUNTER
Received call from patient requesting refill for Trazodone.     Script sent to pharmacy for refill.

## 2024-09-12 ENCOUNTER — TELEPHONE (OUTPATIENT)
Dept: INTERNAL MEDICINE CLINIC | Facility: CLINIC | Age: 49
End: 2024-09-12

## 2024-10-11 DIAGNOSIS — G47.9 SLEEP DISTURBANCE: ICD-10-CM

## 2024-10-11 RX ORDER — TRAZODONE HYDROCHLORIDE 100 MG/1
100 TABLET ORAL
Qty: 30 TABLET | Refills: 0 | Status: SHIPPED | OUTPATIENT
Start: 2024-10-11

## 2024-10-11 NOTE — TELEPHONE ENCOUNTER
Received call from patient requesting refill of his Trazadone.     30 day script sent for refill.     Please reach out to patient for an annual physical appointment that is due.

## 2024-12-10 DIAGNOSIS — G47.9 SLEEP DISTURBANCE: ICD-10-CM

## 2024-12-10 RX ORDER — TRAZODONE HYDROCHLORIDE 100 MG/1
100 TABLET ORAL
Qty: 30 TABLET | Refills: 0 | Status: SHIPPED | OUTPATIENT
Start: 2024-12-10

## 2024-12-10 NOTE — TELEPHONE ENCOUNTER
Received call from patient requesting a refill of his Trazadone.     Patient is due for an annual physical.     Will fill for 30 day supply-Please reach out to patient to schedule him an appointment.

## 2025-01-05 DIAGNOSIS — G47.9 SLEEP DISTURBANCE: ICD-10-CM

## 2025-01-05 NOTE — TELEPHONE ENCOUNTER
Medication Refill Request     Name trazodone   Dose/Frequency 100mg at bedtime  Quantity 30  Verified pharmacy   [x]  Verified ordering Provider   [x]  Does patient have enough for the next 3 days? Yes [] No [x]       Patient stated he can wait until Tuesday for the medication to be refilled.

## 2025-01-06 RX ORDER — TRAZODONE HYDROCHLORIDE 100 MG/1
100 TABLET ORAL
Qty: 30 TABLET | Refills: 0 | OUTPATIENT
Start: 2025-01-06

## 2025-02-02 ENCOUNTER — NURSE TRIAGE (OUTPATIENT)
Dept: OTHER | Facility: OTHER | Age: 50
End: 2025-02-02

## 2025-02-02 NOTE — TELEPHONE ENCOUNTER
"Reason for Disposition  • [1] Prescription refill request for NON-ESSENTIAL medicine (i.e., no harm to patient if med not taken) AND [2] triager unable to refill per department policy    Answer Assessment - Initial Assessment Questions  1. DRUG NAME: \"What medicine do you need to have refilled?\"      traZODone (DESYREL) 100 mg tablet   2. REFILLS REMAINING: \"How many refills are remaining?\" (Note: The label on the medicine or pill bottle will show how many refills are remaining. If there are no refills remaining, then a renewal may be needed.)      0  3. EXPIRATION DATE: \"What is the expiration date?\" (Note: The label states when the prescription will , and thus can no longer be refilled.)      0  4. PRESCRIBING HCP: \"Who prescribed it?\" Reason: If prescribed by specialist, call should be referred to that group.      PCP  5. SYMPTOMS: \"Do you have any symptoms?\"    Denies    Protocols used: Medication Refill and Renewal Call-Adult-    "

## 2025-02-02 NOTE — TELEPHONE ENCOUNTER
Regarding: med refill request/less than 3 days worth  ----- Message from Latesha LOPEZ sent at 2/2/2025  4:43 AM EST -----  Pt requesting a courtesy refill of the following medication, has less than 3 days worth:    traZODone (DESYREL) 100 mg tablet   Dose: 100 mg  Dispense Quantity: 30 tablet  Sig: Take 1 tablet (100 mg total) by mouth daily at bedtime  Ordering and Authorizing Provider:  Kimmy Knight [address] [place] [address]  Phone: [phone]   Fax: [phone]  DWIGHT #: --   NPI: [id number]     [address] [place], [place]  [address] [facility]  [address] [place] [address] [place], [place] [address]  Phone: [phone]  Fax: [phone]  DWIGHT #: --

## 2025-02-02 NOTE — TELEPHONE ENCOUNTER
Patient calling for medication refill. Has enough for Sunday night. Informed of prior refusal due to overdue appointment. Patient states will call Monday when office is open. No further questions.

## 2025-02-03 NOTE — TELEPHONE ENCOUNTER
Spoke to patient and advised he will need an appt before any further refills as he has not been here since 2023. Patient stated he hasn't come in because he doesn't currently have insurance. I advised him he can apply for our Star program which he said he was not aware that we had. I advised he can call financial ahead of time or he can speak to them when he comes in. Patient was agreeable and appt scheduled for Wed. 2/5/25.

## 2025-02-05 ENCOUNTER — OFFICE VISIT (OUTPATIENT)
Dept: INTERNAL MEDICINE CLINIC | Facility: CLINIC | Age: 50
End: 2025-02-05

## 2025-02-05 VITALS
BODY MASS INDEX: 31.32 KG/M2 | DIASTOLIC BLOOD PRESSURE: 92 MMHG | WEIGHT: 188 LBS | HEIGHT: 65 IN | SYSTOLIC BLOOD PRESSURE: 124 MMHG | HEART RATE: 82 BPM | TEMPERATURE: 98.4 F

## 2025-02-05 DIAGNOSIS — Z59.9 FINANCIAL DIFFICULTY: ICD-10-CM

## 2025-02-05 DIAGNOSIS — M51.26 PROTRUDED LUMBAR DISC: ICD-10-CM

## 2025-02-05 DIAGNOSIS — R35.89 POLYURIA: ICD-10-CM

## 2025-02-05 DIAGNOSIS — Z79.890 LONG-TERM CURRENT USE OF TESTOSTERONE REPLACEMENT THERAPY: ICD-10-CM

## 2025-02-05 DIAGNOSIS — R79.89 ELEVATED SERUM CREATININE: ICD-10-CM

## 2025-02-05 DIAGNOSIS — Z00.00 ANNUAL PHYSICAL EXAM: Primary | ICD-10-CM

## 2025-02-05 DIAGNOSIS — N39.43 URINARY DRIBBLING: ICD-10-CM

## 2025-02-05 DIAGNOSIS — G47.9 SLEEP DISTURBANCE: ICD-10-CM

## 2025-02-05 DIAGNOSIS — Z91.89 NEED FOR DENTAL CARE: ICD-10-CM

## 2025-02-05 DIAGNOSIS — Z13.220 LIPID SCREENING: ICD-10-CM

## 2025-02-05 DIAGNOSIS — R06.83 SNORING: ICD-10-CM

## 2025-02-05 DIAGNOSIS — M54.16 LUMBAR RADICULOPATHY: ICD-10-CM

## 2025-02-05 PROCEDURE — 99396 PREV VISIT EST AGE 40-64: CPT | Performed by: INTERNAL MEDICINE

## 2025-02-05 RX ORDER — TRAZODONE HYDROCHLORIDE 100 MG/1
100 TABLET ORAL
Qty: 30 TABLET | Refills: 0 | Status: SHIPPED | OUTPATIENT
Start: 2025-02-05

## 2025-02-05 SDOH — ECONOMIC STABILITY - INCOME SECURITY: PROBLEM RELATED TO HOUSING AND ECONOMIC CIRCUMSTANCES, UNSPECIFIED: Z59.9

## 2025-02-05 NOTE — ASSESSMENT & PLAN NOTE
Noted on imaging  Radiates down leg, aggravated by workouts    Orders:    Ambulatory Referral to [facility]; Future

## 2025-02-05 NOTE — PROGRESS NOTES
Adult Annual Physical  Name: Han Warner      : 1975      MRN: [id number]  Encounter Provider: Gila Montgomery MD  Encounter Date: 2025   Encounter [facility]    Assessment & Plan  Sleep disturbance  Will workup sleep apnea  Orders:    traZODone (DESYREL) 100 mg tablet; Take 1 tablet (100 mg total) by mouth daily at bedtime    Lumbar radiculopathy  Noted on imaging  Radiates down leg, aggravated by workouts    Orders:    Ambulatory Referral to [facility]; Future    Protruded lumbar disc         Financial difficulty  Self pay  Orders:    Ambulatory Referral to Financial Counseling Program; Future    Elevated serum creatinine  Likely related to body building  Orders:    Protein / creatinine ratio, urine; Future    Urinalysis with microscopic; Future    Testosterone, Free+Total LC/MS; Future    Basic metabolic panel; Future    Cystatin C With eGFR; Future    Urinary dribbling  Potentially related to excessive caffeine intake    Orders:    Hemoglobin A1C; Future    PSA, total and free; Future    CBC and differential; Future    Snoring    Orders:    Ambulatory Referral to Sleep Medicine; Future    Polyuria    Orders:    Hemoglobin A1C; Future    Lipid screening    Orders:    Lipid panel; Future    Need for dental care    Orders:    Ambulatory Referral to Dentistry; Future    Annual physical exam         Long-term current use of testosterone replacement therapy  Taking a daily OTC supplement but does not have erectile dysfunction, feels it helps him  Discussed longterm risks, hold supplement prior to testing    Orders:    Testosterone, Free+Total LC/MS; Future    Immunizations and preventive care screenings were discussed with patient today. Appropriate education was printed on patient's after visit summary.        Counseling:  Alcohol/drug use: discussed moderation in alcohol intake, the recommendations for healthy alcohol use, and avoidance  of illicit drug use.  Dental Health: discussed importance of regular tooth brushing, flossing, and dental visits.  Exercise: the importance of regular exercise/physical activity was discussed. Recommend exercise 3-5 times per week for at least 30 minutes.     BMI Counseling: Body mass index is 31.28 kg/m². The BMI is above normal. Nutrition recommendations include encouraging healthy choices of fruits and vegetables and moderation in carbohydrate intake. Exercise recommendations include strength training exercises. Rationale for BMI follow-up plan is due to patient being overweight or obese.     Depression Screening and Follow-up Plan: Patient was screened for depression during today's encounter. They screened negative with a PHQ-2 score of 0.        History of Present Illness     Adult Annual Physical:  Patient presents for annual physical.     Diet and Physical Activity:  - Diet/Nutrition: well balanced diet, low fat diet and frequent junk food.  - Exercise: vigorous cardiovascular exercise, strength training exercises and 5-7 times a week on average.    Depression Screening:  - PHQ-2 Score: 0    General Health:  - Sleep: sleeps poorly, 4-6 hours of sleep on average and snores loudly. night shift  - Hearing: normal hearing bilateral ears.  - Vision: no vision problems, most recent eye exam > 1 year ago and wears glasses and contacts.  - Dental: no dental visits for > 1 year.     Health:  - History of STDs: no.   - Urinary symptoms: urinary frequency, post-void dribbling, nocturia, urinary retention and urethral discharge.     Advanced Care Planning:  - Has an advanced directive?: no    - Has a durable medical POA?: no    - ACP document given to patient?: yes      49 yr old F with PMH of Annual allergic contact dermatitis, GERD, Mild noncompressive lumbar degenerative spine who presents for annual.  Next colonoscopy due 2030, One polyp measuring smaller than 5 mm in the cecum.  2022 TSH and A1C normal  Patient  "does security work, does competitions for power lifting and chamionship. Preworkout with plan C4, and contains caffeine. Testosterone booster, fish oil B complex, No creatine. Stable weight for 14 years.   Eats whatever he wants with no diet restrictions. No alcohol or drug use.   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  Admits to urinary frequency, dribbling, polyuria    Imaging-  small left disc protrusion at L3-4 without any foraminal or central stenosis, and a small left disc protrusion at L5-S1 with minimal left recess stenosis     Review of Systems   Constitutional:  Negative for chills and fever.   HENT:  Negative for congestion.    Eyes:  Negative for visual disturbance.   Respiratory:  Negative for shortness of breath.    Cardiovascular:  Negative for chest pain.   Gastrointestinal:  Negative for abdominal pain, diarrhea, nausea and vomiting.   Endocrine: Positive for polyuria.   Genitourinary:  Positive for frequency. Negative for dysuria.   Musculoskeletal:  Positive for back pain.   Neurological:  Positive for headaches. Negative for dizziness.         Objective   /92 (BP Location: Left arm)   Pulse 82   Temp 98.4 °F (36.9 °C) (Temporal)   Ht 5' 5\" (1.651 m)   Wt 85.3 kg (188 lb)   BMI 31.28 kg/m²     Physical Exam  Vitals reviewed.   Constitutional:       General: He is not in acute distress.  HENT:      Head: Normocephalic and atraumatic.   Eyes:      Conjunctiva/sclera: Conjunctivae normal.   Cardiovascular:      Rate and Rhythm: Normal rate and regular rhythm.      Pulses: Normal pulses.      Heart sounds: Normal heart sounds.   Pulmonary:      Effort: Pulmonary effort is normal.      Breath sounds: Normal breath sounds.   Abdominal:      Palpations: Abdomen is soft.      Tenderness: There is no abdominal tenderness.   Musculoskeletal:         General: Normal range of motion. "   Neurological:      Mental Status: He is alert and oriented to person, place, and time.

## 2025-02-06 ENCOUNTER — TELEPHONE (OUTPATIENT)
Dept: PHYSICAL THERAPY | Facility: OTHER | Age: 50
End: 2025-02-06

## 2025-02-06 ENCOUNTER — TRANSCRIBE ORDERS (OUTPATIENT)
Dept: SLEEP CENTER | Facility: CLINIC | Age: 50
End: 2025-02-06

## 2025-02-06 DIAGNOSIS — R06.83 SNORING: Primary | ICD-10-CM

## 2025-02-06 NOTE — PATIENT INSTRUCTIONS
"Patient Education     Routine physical for adults   The Basics   Written by the doctors and editors at [facility]   What is a physical? -- A physical is a routine visit, or \"check-up,\" with your doctor. You might also hear it called a \"wellness visit\" or \"preventive visit.\"  During each visit, the doctor will:   Ask about your physical and mental health   Ask about your habits, behaviors, and lifestyle   Do an exam   Give you vaccines if needed   Talk to you about any medicines you take   Give advice about your health   Answer your questions  Getting regular check-ups is an important part of taking care of your health. It can help your doctor find and treat any problems you have. But it's also important for preventing health problems.  A routine physical is different from a \"sick visit.\" A sick visit is when you see a doctor because of a health concern or problem. Since physicals are scheduled ahead of time, you can think about what you want to ask the doctor.  How often should I get a physical? -- It depends on your age and health. In general, for people age 21 years and older:   If you are younger than 50 years, you might be able to get a physical every 3 years.   If you are 50 years or older, your doctor might recommend a physical every year.  If you have an ongoing health condition, like diabetes or high blood pressure, your doctor will probably want to see you more often.  What happens during a physical? -- In general, each visit will include:   Physical exam - The doctor or nurse will check your height, weight, heart rate, and blood pressure. They will also look at your eyes and ears. They will ask about how you are feeling and whether you have any symptoms that bother you.   Medicines - It's a good idea to bring a list of all the medicines you take to each doctor visit. Your doctor will talk to you about your medicines and answer any questions. Tell them if you are having any side effects that bother you. You " "should also tell them if you are having trouble paying for any of your medicines.   Habits and behaviors - This includes:   Your diet   Your exercise habits   Whether you smoke, drink alcohol, or use drugs   Whether you are sexually active   Whether you feel safe at home  Your doctor will talk to you about things you can do to improve your health and lower your risk of health problems. They will also offer help and support. For example, if you want to quit smoking, they can give you advice and might prescribe medicines. If you want to improve your diet or get more physical activity, they can help you with this, too.   Lab tests, if needed - The tests you get will depend on your age and situation. For example, your doctor might want to check your:   Cholesterol   Blood sugar   Iron level   Vaccines - The recommended vaccines will depend on your age, health, and what vaccines you already had. Vaccines are very important because they can prevent certain serious or deadly infections.   Discussion of screening - \"Screening\" means checking for diseases or other health problems before they cause symptoms. Your doctor can recommend screening based on your age, risk, and preferences. This might include tests to check for:   Cancer, such as breast, prostate, cervical, ovarian, colorectal, prostate, lung, or skin cancer   Sexually transmitted infections, such as chlamydia and gonorrhea   Mental health conditions like depression and anxiety  Your doctor will talk to you about the different types of screening tests. They can help you decide which screenings to have. They can also explain what the results might mean.   Answering questions - The physical is a good time to ask the doctor or nurse questions about your health. If needed, they can refer you to other doctors or specialists, too.  Adults older than 65 years often need other care, too. As you get older, your doctor will talk to you about:   How to prevent falling at " home   Hearing or vision tests   Memory testing   How to take your medicines safely   Making sure that you have the help and support you need at home  All topics are updated as new evidence becomes available and our peer review process is complete.  This topic retrieved from [employer] on: May 02, 2024.  Topic 700707 Version 1.0  Release: 32.4.3 - C32.122  © 2024 UpToDate, Inc. and/or its affiliates. All rights reserved.  Consumer Information Use and Disclaimer   Disclaimer: This generalized information is a limited summary of diagnosis, treatment, and/or medication information. It is not meant to be comprehensive and should be used as a tool to help the user understand and/or assess potential diagnostic and treatment options. It does NOT include all information about conditions, treatments, medications, side effects, or risks that may apply to a specific patient. It is not intended to be medical advice or a substitute for the medical advice, diagnosis, or treatment of a health care provider based on the health care provider's examination and assessment of a patient's specific and unique circumstances. Patients must speak with a health care provider for complete information about their health, medical questions, and treatment options, including any risks or benefits regarding use of medications. This information does not endorse any treatments or medications as safe, effective, or approved for treating a specific patient. UpToDate, Inc. and its affiliates disclaim any warranty or liability relating to this information or the use thereof.The use of this information is governed by the Terms of Use, available at https://www.[employer]ingu[employer].com/en/know/clinical-effectiveness-terms. 2024© UpToDate, Inc. and its affiliates and/or licensors. All rights reserved.  Copyright   © 2024 UpToDate, Inc. and/or its affiliates. All rights reserved.

## 2025-02-06 NOTE — TELEPHONE ENCOUNTER
Call placed to the patient per [facility] referral.    Voice message left for patient to call back. Phone number and hours of business provided.     This is the 1st attempt to reach the patient.  Will defer per protocol.    NOTE: Pt is established with PM/Dr Seth. Last saw them 2023. Was supposed to f/u with PM after MRI, but Pt cx appt.     Has tried pt for lumbar radiculopathy 11/29/2022 to 12/27/2022

## 2025-02-12 NOTE — TELEPHONE ENCOUNTER
Call placed to the patient per [facility] referral.     V/M left for patient to call back. Phone number and hours of business provided.      This is the 2nd attempt to reach the patient.  Will defer per protocol.     NOTE: Pt is established with PM/Dr Seth. Last saw them 2023. Was supposed to f/u with PM after MRI, but Pt cx appt.      Has tried pt for lumbar radiculopathy 11/29/2022 to 12/27/2022

## 2025-03-03 DIAGNOSIS — G47.9 SLEEP DISTURBANCE: ICD-10-CM

## 2025-03-03 RX ORDER — TRAZODONE HYDROCHLORIDE 100 MG/1
100 TABLET ORAL
Qty: 30 TABLET | Refills: 3 | Status: SHIPPED | OUTPATIENT
Start: 2025-03-03

## 2025-03-18 PROBLEM — R79.89 ELEVATED SERUM CREATININE: Status: ACTIVE | Noted: 2025-03-18

## 2025-03-19 ENCOUNTER — TELEPHONE (OUTPATIENT)
Dept: INTERNAL MEDICINE CLINIC | Facility: CLINIC | Age: 50
End: 2025-03-19

## 2025-04-07 ENCOUNTER — TELEPHONE (OUTPATIENT)
Dept: INTERNAL MEDICINE CLINIC | Facility: CLINIC | Age: 50
End: 2025-04-07

## 2025-04-07 NOTE — TELEPHONE ENCOUNTER
Received voice message from patient requesting a refill of his trazodone.     Contacted patient's [facility] pharmacy at . Introduced self and role to pharmacist. Pharmacist updated patient reached out to office requesting refill of his Trazodone. Patient has refills available and pharmacy working to fill.     No other needs noted.

## 2025-07-02 ENCOUNTER — TELEPHONE (OUTPATIENT)
Dept: INTERNAL MEDICINE CLINIC | Facility: CLINIC | Age: 50
End: 2025-07-02

## 2025-07-02 DIAGNOSIS — G47.9 SLEEP DISTURBANCE: ICD-10-CM

## 2025-07-02 RX ORDER — TRAZODONE HYDROCHLORIDE 100 MG/1
100 TABLET ORAL
Qty: 30 TABLET | Refills: 3 | Status: SHIPPED | OUTPATIENT
Start: 2025-07-02